# Patient Record
Sex: MALE | Race: WHITE | NOT HISPANIC OR LATINO | Employment: OTHER | URBAN - METROPOLITAN AREA
[De-identification: names, ages, dates, MRNs, and addresses within clinical notes are randomized per-mention and may not be internally consistent; named-entity substitution may affect disease eponyms.]

---

## 2017-04-21 ENCOUNTER — HOSPITAL ENCOUNTER (OUTPATIENT)
Dept: ONCOLOGY | Facility: CLINIC | Age: 77
Discharge: HOME | End: 2017-04-21
Attending: INTERNAL MEDICINE | Admitting: INTERNAL MEDICINE

## 2017-04-21 LAB
ALBUMIN SERPL-MCNC: 3.8 G/DL (ref 3.5–4.8)
ALBUMIN/GLOB SERPL: 1.5 {RATIO} (ref 1–1.7)
ALP SERPL-CCNC: 52 IU/L (ref 32–91)
ALT SERPL-CCNC: 17 IU/L (ref 17–63)
ANION GAP SERPL CALC-SCNC: 11.3 MMOL/L (ref 10–20)
AST SERPL-CCNC: 20 IU/L (ref 15–41)
BILIRUB SERPL-MCNC: 1.1 MG/DL (ref 0.3–1.2)
BUN SERPL-MCNC: 11 MG/DL (ref 8–20)
BUN/CREAT SERPL: 11 (ref 6.2–20.3)
CALCIUM SERPL-MCNC: 8.9 MG/DL (ref 8.9–10.3)
CHLORIDE SERPL-SCNC: 107 MMOL/L (ref 101–111)
CONV CO2: 28 MMOL/L (ref 22–32)
CONV TOTAL PROTEIN: 6.4 G/DL (ref 6.1–7.9)
CREAT UR-MCNC: 1 MG/DL (ref 0.7–1.2)
GLOBULIN UR ELPH-MCNC: 2.6 G/DL (ref 2.5–3.8)
GLUCOSE SERPL-MCNC: 112 MG/DL (ref 65–99)
POTASSIUM SERPL-SCNC: 4.3 MMOL/L (ref 3.6–5.1)
SODIUM SERPL-SCNC: 142 MMOL/L (ref 136–144)

## 2017-04-27 ENCOUNTER — HOSPITAL ENCOUNTER (OUTPATIENT)
Dept: ONCOLOGY | Facility: CLINIC | Age: 77
Discharge: HOME | End: 2017-04-27
Attending: INTERNAL MEDICINE | Admitting: INTERNAL MEDICINE

## 2017-04-27 LAB — GLUCOSE BLD-MCNC: 100 MG/DL (ref 70–105)

## 2017-05-22 ENCOUNTER — HOSPITAL ENCOUNTER (OUTPATIENT)
Dept: ONCOLOGY | Facility: CLINIC | Age: 77
Discharge: HOME | End: 2017-05-22
Attending: INTERNAL MEDICINE | Admitting: INTERNAL MEDICINE

## 2017-05-22 LAB — FOLATE SERPL-MCNC: 14.5 NG/ML (ref 5.9–24.8)

## 2017-05-24 LAB
ANA SER QL IA: NORMAL

## 2017-06-02 ENCOUNTER — HOSPITAL ENCOUNTER (OUTPATIENT)
Dept: ONCOLOGY | Facility: CLINIC | Age: 77
Discharge: HOME | End: 2017-06-02
Attending: INTERNAL MEDICINE | Admitting: INTERNAL MEDICINE

## 2017-06-26 ENCOUNTER — HOSPITAL ENCOUNTER (OUTPATIENT)
Dept: ONCOLOGY | Facility: CLINIC | Age: 77
Discharge: HOME | End: 2017-06-26
Attending: INTERNAL MEDICINE | Admitting: INTERNAL MEDICINE

## 2017-06-26 LAB
ALBUMIN SERPL-MCNC: 3.9 G/DL (ref 3.5–4.8)
ALBUMIN/GLOB SERPL: 1.6 {RATIO} (ref 1–1.7)
ALP SERPL-CCNC: 58 IU/L (ref 32–91)
ALT SERPL-CCNC: 19 IU/L (ref 17–63)
ANION GAP SERPL CALC-SCNC: 13.3 MMOL/L (ref 10–20)
AST SERPL-CCNC: 20 IU/L (ref 15–41)
BILIRUB SERPL-MCNC: 0.8 MG/DL (ref 0.3–1.2)
BUN SERPL-MCNC: 14 MG/DL (ref 8–20)
BUN/CREAT SERPL: 12.7 (ref 6.2–20.3)
CALCIUM SERPL-MCNC: 9.3 MG/DL (ref 8.9–10.3)
CHLORIDE SERPL-SCNC: 105 MMOL/L (ref 101–111)
CONV CO2: 28 MMOL/L (ref 22–32)
CONV TOTAL PROTEIN: 6.3 G/DL (ref 6.1–7.9)
CREAT UR-MCNC: 1.1 MG/DL (ref 0.7–1.2)
GLOBULIN UR ELPH-MCNC: 2.4 G/DL (ref 2.5–3.8)
GLUCOSE SERPL-MCNC: 117 MG/DL (ref 65–99)
POTASSIUM SERPL-SCNC: 4.3 MMOL/L (ref 3.6–5.1)
SODIUM SERPL-SCNC: 142 MMOL/L (ref 136–144)

## 2017-09-28 ENCOUNTER — CLINICAL SUPPORT (OUTPATIENT)
Dept: ONCOLOGY | Facility: HOSPITAL | Age: 77
End: 2017-09-28

## 2017-09-28 ENCOUNTER — HOSPITAL ENCOUNTER (OUTPATIENT)
Dept: ONCOLOGY | Facility: CLINIC | Age: 77
Setting detail: INFUSION SERIES
Discharge: HOME | End: 2017-09-28
Attending: NURSE PRACTITIONER | Admitting: NURSE PRACTITIONER

## 2017-09-28 ENCOUNTER — HOSPITAL ENCOUNTER (OUTPATIENT)
Dept: ONCOLOGY | Facility: HOSPITAL | Age: 77
Discharge: HOME | End: 2017-09-28
Attending: NURSE PRACTITIONER | Admitting: NURSE PRACTITIONER

## 2017-09-28 NOTE — PROGRESS NOTES
PATIENTS ONCOLOGY RECORD LOCATED IN [facility]      Subjective     Name:  REEMA LANG     Date:  2017  Address:   [address] [address]  Home: [phone]  :  1940 AGE:  77 y.o.        RECORDS OBTAINED:  Patients Oncology Record is located in [facility]

## 2017-12-18 ENCOUNTER — HOSPITAL ENCOUNTER (OUTPATIENT)
Dept: ONCOLOGY | Facility: HOSPITAL | Age: 77
Discharge: HOME | End: 2017-12-18
Attending: INTERNAL MEDICINE | Admitting: INTERNAL MEDICINE

## 2017-12-18 LAB — CREAT BLDA-MCNC: 1 MG/DL (ref 0.6–1.3)

## 2017-12-28 ENCOUNTER — CLINICAL SUPPORT (OUTPATIENT)
Dept: ONCOLOGY | Facility: HOSPITAL | Age: 77
End: 2017-12-28

## 2017-12-28 ENCOUNTER — HOSPITAL ENCOUNTER (OUTPATIENT)
Dept: ONCOLOGY | Facility: CLINIC | Age: 77
Setting detail: INFUSION SERIES
Discharge: HOME | End: 2017-12-28
Attending: INTERNAL MEDICINE | Admitting: INTERNAL MEDICINE

## 2017-12-28 ENCOUNTER — HOSPITAL ENCOUNTER (OUTPATIENT)
Dept: ONCOLOGY | Facility: HOSPITAL | Age: 77
Discharge: HOME | End: 2017-12-28
Attending: INTERNAL MEDICINE | Admitting: INTERNAL MEDICINE

## 2017-12-28 NOTE — PROGRESS NOTES
PATIENTS ONCOLOGY RECORD LOCATED IN [facility]      Subjective     Name:  REEMA LANG     Date:  2017  [address]   [facility] [place] [address]  Home: [phone]  :  1940 AGE:  77 y.o.        RECORDS OBTAINED:  Patients Oncology Record is located in [facility]

## 2018-06-21 ENCOUNTER — HOSPITAL ENCOUNTER (OUTPATIENT)
Dept: ONCOLOGY | Facility: HOSPITAL | Age: 78
Discharge: HOME | End: 2018-06-21
Attending: INTERNAL MEDICINE | Admitting: INTERNAL MEDICINE

## 2018-06-21 LAB — CREAT BLDA-MCNC: <0.2 MG/DL (ref 0.6–1.3)

## 2018-06-28 ENCOUNTER — HOSPITAL ENCOUNTER (OUTPATIENT)
Dept: ONCOLOGY | Facility: CLINIC | Age: 78
Setting detail: INFUSION SERIES
Discharge: HOME | End: 2018-06-28
Attending: INTERNAL MEDICINE | Admitting: INTERNAL MEDICINE

## 2018-06-28 ENCOUNTER — HOSPITAL ENCOUNTER (OUTPATIENT)
Dept: ONCOLOGY | Facility: HOSPITAL | Age: 78
Discharge: HOME | End: 2018-06-28
Attending: INTERNAL MEDICINE | Admitting: INTERNAL MEDICINE

## 2018-06-28 ENCOUNTER — CLINICAL SUPPORT (OUTPATIENT)
Dept: ONCOLOGY | Facility: HOSPITAL | Age: 78
End: 2018-06-28

## 2018-06-28 LAB
ALBUMIN SERPL-MCNC: 3.9 G/DL (ref 3.5–4.8)
ALBUMIN/GLOB SERPL: 1.4 {RATIO} (ref 1–1.7)
ALP SERPL-CCNC: 57 IU/L (ref 32–91)
ALT SERPL-CCNC: 18 IU/L (ref 17–63)
ANION GAP SERPL CALC-SCNC: 9 MMOL/L (ref 10–20)
AST SERPL-CCNC: 20 IU/L (ref 15–41)
BILIRUB SERPL-MCNC: 0.9 MG/DL (ref 0.3–1.2)
BUN SERPL-MCNC: 11 MG/DL (ref 8–20)
BUN/CREAT SERPL: 12.2 (ref 6.2–20.3)
CALCIUM SERPL-MCNC: 9 MG/DL (ref 8.9–10.3)
CHLORIDE SERPL-SCNC: 106 MMOL/L (ref 101–111)
CONV CO2: 27 MMOL/L (ref 22–32)
CONV TOTAL PROTEIN: 6.6 G/DL (ref 6.1–7.9)
CREAT UR-MCNC: 0.9 MG/DL (ref 0.7–1.2)
ERYTHROCYTE [SEDIMENTATION RATE] IN BLOOD BY WESTERGREN METHOD: 9 MM/HR (ref 0–20)
GLOBULIN UR ELPH-MCNC: 2.7 G/DL (ref 2.5–3.8)
GLUCOSE SERPL-MCNC: 123 MG/DL (ref 65–99)
LDH SERPL-CCNC: 148 IU/L (ref 98–192)
POTASSIUM SERPL-SCNC: 4 MMOL/L (ref 3.6–5.1)
SODIUM SERPL-SCNC: 138 MMOL/L (ref 136–144)

## 2018-06-28 NOTE — PROGRESS NOTES
PATIENTS ONCOLOGY RECORD LOCATED IN [facility]      Subjective     Name:  REEMA LANG     Date:  2018  [address] [place] [address]  Home: [phone]  :  1940 AGE:  78 y.o.        RECORDS OBTAINED:  Patients Oncology Record is located in [facility]

## 2018-07-18 ENCOUNTER — HOSPITAL ENCOUNTER (OUTPATIENT)
Dept: OTHER | Facility: HOSPITAL | Age: 78
Setting detail: SPECIMEN
Discharge: HOME | End: 2018-07-18
Attending: INTERNAL MEDICINE | Admitting: INTERNAL MEDICINE

## 2018-07-18 ENCOUNTER — OFFICE (AMBULATORY)
Dept: URBAN - METROPOLITAN AREA PATHOLOGY 4 | Facility: PATHOLOGY | Age: 78
End: 2018-07-18

## 2018-07-18 ENCOUNTER — ON CAMPUS - OUTPATIENT (AMBULATORY)
Dept: URBAN - METROPOLITAN AREA HOSPITAL 2 | Facility: HOSPITAL | Age: 78
End: 2018-07-18

## 2018-07-18 VITALS
DIASTOLIC BLOOD PRESSURE: 83 MMHG | HEART RATE: 64 BPM | SYSTOLIC BLOOD PRESSURE: 136 MMHG | HEART RATE: 59 BPM | DIASTOLIC BLOOD PRESSURE: 77 MMHG | RESPIRATION RATE: 16 BRPM | HEART RATE: 83 BPM | DIASTOLIC BLOOD PRESSURE: 57 MMHG | SYSTOLIC BLOOD PRESSURE: 119 MMHG | RESPIRATION RATE: 14 BRPM | OXYGEN SATURATION: 97 % | DIASTOLIC BLOOD PRESSURE: 70 MMHG | WEIGHT: 183 LBS | SYSTOLIC BLOOD PRESSURE: 112 MMHG | OXYGEN SATURATION: 96 % | SYSTOLIC BLOOD PRESSURE: 109 MMHG | HEART RATE: 75 BPM | HEART RATE: 66 BPM | DIASTOLIC BLOOD PRESSURE: 73 MMHG | SYSTOLIC BLOOD PRESSURE: 106 MMHG | DIASTOLIC BLOOD PRESSURE: 68 MMHG | HEART RATE: 77 BPM | DIASTOLIC BLOOD PRESSURE: 72 MMHG | OXYGEN SATURATION: 95 % | HEART RATE: 73 BPM | DIASTOLIC BLOOD PRESSURE: 66 MMHG | RESPIRATION RATE: 17 BRPM | SYSTOLIC BLOOD PRESSURE: 148 MMHG | OXYGEN SATURATION: 100 % | SYSTOLIC BLOOD PRESSURE: 101 MMHG | HEART RATE: 56 BPM | SYSTOLIC BLOOD PRESSURE: 125 MMHG | SYSTOLIC BLOOD PRESSURE: 115 MMHG | HEART RATE: 76 BPM | DIASTOLIC BLOOD PRESSURE: 75 MMHG | TEMPERATURE: 97.5 F | SYSTOLIC BLOOD PRESSURE: 107 MMHG | SYSTOLIC BLOOD PRESSURE: 105 MMHG | HEIGHT: 72 IN | DIASTOLIC BLOOD PRESSURE: 63 MMHG | OXYGEN SATURATION: 98 % | RESPIRATION RATE: 18 BRPM | SYSTOLIC BLOOD PRESSURE: 146 MMHG

## 2018-07-18 DIAGNOSIS — D12.3 BENIGN NEOPLASM OF TRANSVERSE COLON: ICD-10-CM

## 2018-07-18 DIAGNOSIS — K63.5 POLYP OF COLON: ICD-10-CM

## 2018-07-18 DIAGNOSIS — R10.13 EPIGASTRIC PAIN: ICD-10-CM

## 2018-07-18 DIAGNOSIS — K22.2 ESOPHAGEAL OBSTRUCTION: ICD-10-CM

## 2018-07-18 DIAGNOSIS — Z86.010 PERSONAL HISTORY OF COLONIC POLYPS: ICD-10-CM

## 2018-07-18 DIAGNOSIS — D12.2 BENIGN NEOPLASM OF ASCENDING COLON: ICD-10-CM

## 2018-07-18 DIAGNOSIS — K29.50 UNSPECIFIED CHRONIC GASTRITIS WITHOUT BLEEDING: ICD-10-CM

## 2018-07-18 DIAGNOSIS — R13.10 DYSPHAGIA, UNSPECIFIED: ICD-10-CM

## 2018-07-18 PROBLEM — K29.70 GASTRITIS, UNSPECIFIED, WITHOUT BLEEDING: Status: ACTIVE | Noted: 2018-07-18

## 2018-07-18 PROBLEM — D12.5 BENIGN NEOPLASM OF SIGMOID COLON: Status: ACTIVE | Noted: 2018-07-18

## 2018-07-18 LAB
GI HISTOLOGY: A. SELECT: (no result)
GI HISTOLOGY: B. UNSPECIFIED: (no result)
GI HISTOLOGY: C. UNSPECIFIED: (no result)
GI HISTOLOGY: D. UNSPECIFIED: (no result)
GI HISTOLOGY: PDF REPORT: (no result)

## 2018-07-18 PROCEDURE — 88305 TISSUE EXAM BY PATHOLOGIST: CPT | Mod: 26 | Performed by: INTERNAL MEDICINE

## 2018-07-18 PROCEDURE — 45385 COLONOSCOPY W/LESION REMOVAL: CPT | Mod: PT | Performed by: INTERNAL MEDICINE

## 2018-07-18 PROCEDURE — 45380 COLONOSCOPY AND BIOPSY: CPT | Mod: 59,PT | Performed by: INTERNAL MEDICINE

## 2018-07-18 PROCEDURE — 43239 EGD BIOPSY SINGLE/MULTIPLE: CPT | Mod: 59 | Performed by: INTERNAL MEDICINE

## 2018-07-18 PROCEDURE — 43450 DILATE ESOPHAGUS 1/MULT PASS: CPT | Performed by: INTERNAL MEDICINE

## 2018-07-18 PROCEDURE — 45380 COLONOSCOPY AND BIOPSY: CPT | Mod: PT,59 | Performed by: INTERNAL MEDICINE

## 2018-07-18 RX ADMIN — PROPOFOL: 10 INJECTION, EMULSION INTRAVENOUS at 14:51

## 2018-07-18 NOTE — SERVICEHPINOTES
ANNY BENITO  is a  78  male   who presents today for a  EGD-Colonoscopy   for   the indications listed below. The updated Patient Profile was reviewed prior to the procedure, in conjunction with the Physical Exam, including medical conditions, surgical procedures, medications, allergies, family history and social history. See Physical Exam time stamp below for date and time of HPI completion.Pre-operatively, I reviewed the indication(s) for the procedure, the risks of the procedure [including but not limited to: unexpected bleeding possibly requiring hospitalization and/or unplanned repeat procedures, perforation possibly requiring surgical treatment, missed lesions and complications of sedation/MAC (also explained by anesthesia staff)]. I have evaluated the patient for risks associated with the planned anesthesia and the procedure to be performed and find the patient an acceptable candidate for IV sedation.Multiple opportunities were provided for any questions or concerns, and all questions were answered satisfactorily before any anesthesia was administered. We will proceed with the planned procedure.BR

## 2018-10-25 ENCOUNTER — HOSPITAL ENCOUNTER (OUTPATIENT)
Dept: ONCOLOGY | Facility: HOSPITAL | Age: 78
Discharge: HOME | End: 2018-10-25
Attending: INTERNAL MEDICINE | Admitting: INTERNAL MEDICINE

## 2018-10-25 ENCOUNTER — CLINICAL SUPPORT (OUTPATIENT)
Dept: ONCOLOGY | Facility: HOSPITAL | Age: 78
End: 2018-10-25

## 2018-10-25 ENCOUNTER — HOSPITAL ENCOUNTER (OUTPATIENT)
Dept: ONCOLOGY | Facility: CLINIC | Age: 78
Setting detail: INFUSION SERIES
Discharge: HOME | End: 2018-10-25
Attending: INTERNAL MEDICINE | Admitting: INTERNAL MEDICINE

## 2018-10-25 LAB — TSH SERPL-ACNC: 1.09 UIU/ML (ref 0.34–5.6)

## 2018-10-25 NOTE — PROGRESS NOTES
PATIENTS ONCOLOGY RECORD LOCATED IN [facility]      Subjective     Name:  REEMA LANG     Date:  10/25/2018  Address:   [address] [place] [address]  Home: [phone]  :  1940 AGE:  78 y.o.        RECORDS OBTAINED:  Patients Oncology Record is located in [facility]

## 2018-12-14 ENCOUNTER — HOSPITAL ENCOUNTER (OUTPATIENT)
Dept: PREADMISSION TESTING | Facility: HOSPITAL | Age: 78
Discharge: HOME | End: 2018-12-14
Attending: ORTHOPAEDIC SURGERY | Admitting: ORTHOPAEDIC SURGERY

## 2018-12-14 LAB
ANION GAP SERPL CALC-SCNC: 12.9 MMOL/L (ref 10–20)
BASOPHILS # BLD AUTO: 0 10*3/UL (ref 0–0.2)
BASOPHILS NFR BLD AUTO: 1 % (ref 0–2)
BUN SERPL-MCNC: 14 MG/DL (ref 8–20)
BUN/CREAT SERPL: 14 (ref 6.2–20.3)
CALCIUM SERPL-MCNC: 8.8 MG/DL (ref 8.9–10.3)
CHLORIDE SERPL-SCNC: 104 MMOL/L (ref 101–111)
CONV CO2: 28 MMOL/L (ref 22–32)
CREAT UR-MCNC: 1 MG/DL (ref 0.7–1.2)
DIFFERENTIAL METHOD BLD: (no result)
EOSINOPHIL # BLD AUTO: 0.1 10*3/UL (ref 0–0.3)
EOSINOPHIL # BLD AUTO: 1 % (ref 0–3)
ERYTHROCYTE [DISTWIDTH] IN BLOOD BY AUTOMATED COUNT: 13.4 % (ref 11.5–14.5)
GLUCOSE SERPL-MCNC: 112 MG/DL (ref 65–99)
HCT VFR BLD AUTO: 43.5 % (ref 40–54)
HGB BLD-MCNC: 14.9 G/DL (ref 14–18)
LYMPHOCYTES # BLD AUTO: 1.2 10*3/UL (ref 0.8–4.8)
LYMPHOCYTES NFR BLD AUTO: 22 % (ref 18–42)
MCH RBC QN AUTO: 30.2 PG (ref 26–32)
MCHC RBC AUTO-ENTMCNC: 34.4 G/DL (ref 32–36)
MCV RBC AUTO: 87.9 FL (ref 80–94)
MONOCYTES # BLD AUTO: 0.5 10*3/UL (ref 0.1–1.3)
MONOCYTES NFR BLD AUTO: 9 % (ref 2–11)
NEUTROPHILS # BLD AUTO: 3.7 10*3/UL (ref 2.3–8.6)
NEUTROPHILS NFR BLD AUTO: 67 % (ref 50–75)
NRBC BLD AUTO-RTO: 0 /100{WBCS}
NRBC/RBC NFR BLD MANUAL: 0 10*3/UL
PLATELET # BLD AUTO: 130 10*3/UL (ref 150–450)
PMV BLD AUTO: 8.7 FL (ref 7.4–10.4)
POTASSIUM SERPL-SCNC: 3.9 MMOL/L (ref 3.6–5.1)
RBC # BLD AUTO: 4.94 10*6/UL (ref 4.6–6)
SODIUM SERPL-SCNC: 141 MMOL/L (ref 136–144)
WBC # BLD AUTO: 5.6 10*3/UL (ref 4.5–11.5)

## 2019-02-28 ENCOUNTER — HOSPITAL ENCOUNTER (OUTPATIENT)
Dept: ONCOLOGY | Facility: CLINIC | Age: 79
Setting detail: INFUSION SERIES
Discharge: HOME | End: 2019-02-28
Attending: NURSE PRACTITIONER | Admitting: NURSE PRACTITIONER

## 2019-02-28 ENCOUNTER — HOSPITAL ENCOUNTER (OUTPATIENT)
Dept: ONCOLOGY | Facility: HOSPITAL | Age: 79
Discharge: HOME | End: 2019-02-28

## 2019-03-18 ENCOUNTER — HOSPITAL ENCOUNTER (OUTPATIENT)
Dept: OTHER | Facility: HOSPITAL | Age: 79
Setting detail: SPECIMEN
Discharge: HOME | End: 2019-03-18
Attending: UROLOGY | Admitting: UROLOGY

## 2019-03-19 LAB — SPECIMEN SOURCE: NORMAL

## 2019-06-25 ENCOUNTER — TELEPHONE (OUTPATIENT)
Dept: ONCOLOGY | Facility: CLINIC | Age: 79
End: 2019-06-25

## 2019-06-26 DIAGNOSIS — C82.90 FOLLICULAR LYMPHOMA, UNSPECIFIED FOLLICULAR LYMPHOMA TYPE, UNSPECIFIED BODY REGION (HCC): Primary | ICD-10-CM

## 2019-06-26 PROBLEM — C85.10 B-CELL LYMPHOMA (HCC): Status: ACTIVE | Noted: 2019-06-26

## 2019-06-26 PROBLEM — K29.70 GASTRITIS: Status: ACTIVE | Noted: 2019-06-26

## 2019-06-26 PROBLEM — D69.3 CHRONIC ITP (IDIOPATHIC THROMBOCYTOPENIA): Status: ACTIVE | Noted: 2019-06-26

## 2019-06-26 PROBLEM — N28.1 RENAL CYST: Status: ACTIVE | Noted: 2019-06-26

## 2019-06-26 PROBLEM — R31.9 HEMATURIA: Status: ACTIVE | Noted: 2019-06-26

## 2019-06-26 NOTE — PROGRESS NOTES
Hematology/Oncology Outpatient Follow Up    Curly Razo  1940    Primary Care Physician: Jose Mcgowan MD  Referring Physician: Jose Mcgowan MD    Chief Complaint   Patient presents with   • B-cell lymphoma follow up        History of Present Illness:   1. 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   • This patient is a 77-year-old male who has been under the care of Jaime Reeves M.D. for kidney stones since the early 2000’s requiring several extractions and lithotripsies. He gets yearly CT scans. CT of the abdomen has been revealing minor retroperitoneal lymphadenopathy and in 2016 patient was noted to have some scarring in the lungs. He underwent a PET scan on 3/12/16 revealing no nodule in the lung but scarring in the medial right thoracic apex without increased activity. Morphologically his linear scarring was stable since January 2012 and there was no evidence of metastatic disease elsewhere in the body. Large left renal cyst was present. He had a CT scan of the chest repeated on 9/13/16 again noting no discreet measurable lung nodule with stable scarring in the lung apices, right greater than left. There was stable small hiatal hernia and simple-appearing though partially imaged left-sided kidney cyst. CT scan of the abdomen and pelvis was performed at [facility] on 3/31/17 revealing multiple enlarged retroperitoneal lymph nodes, largest a left periaortic node measuring 15 mm short axis, not clearly changed from prior CT’s as well as bilateral intracaliceal calculi with much more calculus burden on the left than the right. There was mild right hydronephrosis, left renal cyst also present. The lymphadenopathy was felt to be stable since 2016 but increased over a four year period and hence Oncology consultation was obtained. The patient himself is denying fevers, night sweats or weight loss. He has not felt any lymph nodes anywhere else in the  body.   • 7/18/13 - EGD and colonoscopy by Nelson Monte M.D. revealed strictures in the gastroesophageal junction that were dilated. Erythema in the antrum compatible with gastritis was biopsied. There was fluid in the stomach body. 4-5 mm polyps in the transverse colon, 3-5 mm polyps in the ascending colon, 3 mm polyp in the sigmoid colon were seen. Pathology on stomach biopsy revealed antral and body/fundic type mucosa with mild chronic gastritis. Transverse colon polyp was a tubular adenoma. Ascending colon polyp was a tubular adenoma and sigmoid colon polyp was hyperplastic. Followup colonoscopy was recommended in three years.   • 4/21/17 - [facility] by Jaime Reeves M.D. for retroperitoneal lymphadenopathy. WBC 5.9, hemoglobin 14.6, platelet count 126,000. Comprehensive metabolic panel with no significant abnormality. ESR 3 (<21).   • 4/27/17 - PET scan with pathologic periaortic lymphadenopathy with most notable abnormal lymph node seen below the level of the left adrenal vein measuring 1.9 x 1.5 cm associated with FDG elevation at uptake maximum of 4.2.    • 5/19/17 - Patient underwent CT-guided biopsy of a retroperitoneal lymph node by Lenny Gonzalez M.D.  Pathology on lymph node biopsy revealed B-cell lymphoma of germinal center origin. A low-grade follicular lymphoma was favored; however, given the scant nature of the specimen a more definite diagnosis could not be rendered.   • 5/22/17 - WBC 5.0, hemoglobin 14.9, platelet count 117,000. Lymph node pathology results not available on today’s visit. Thrombocytopenia workup initiated.   • 6/26/17 - Discussed results of the workup given the history of slow growth of lymph nodes on serial CAT scans. It likely is of low grade. CMP without clinical significance. ESR 6 (<21).   • 10/3/17 - CT abdomen and pelvis at [facility] with bilateral renal calculi with greater stone burden on the left.  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   • 12/18/17 - CT chest with stable irregular density and linear abnormality in the right pulmonary apex, possibly scar. Cardiomegaly, small hiatal hernia and probable cyst in the left kidney.   • 6/21/18 - CT chest with no acute intrathoracic process. CT abdomen and pelvis with slight interval increase in size of upper abdominal retroperitoneal adenopathy when compared to April 2017. Bilateral non-obstructing nephrolithiasis with large non-obstructing left lower pole renal calculi, largest measuring 14 mm.   • 6/28/18 - Patient complains of abdominal pain post eating. EGD recommended.  (). ESR 9 (0-20).     • 10/25/18 - Patient complains of still some mild pain in the epigastric area. Omeprazole 40 mg p.o. daily prescribed. Complains of fatigue for which TSH checked: 1.09.   • 12/14/18 - Chest x-ray with no acute cardiopulmonary process.   • 2/19/19 - Patient seen at [facility] for abdominal pain, nausea, vomiting, diarrhea and dehydration. CT abdomen and pelvis showed multiple bilateral non-obstructing renal stones, no evidence of ureteral stone or hydronephrosis but there was possible bilateral UPJ stenosis. Stable appearance of multiple low-density renal masses, likely cysts. Mild retroperitoneal adenopathy with the largest node measuring 1.9 cm in size, slightly larger from prior study where it measured 1.6 cm. This CT was compared to CT in September 2014.   4/9/19 CT abdomen and pelvis performed at [facility] with mild lower pole  caliectasis on the left.  Possible small focus of infection or infarction in the lower  pole of the left kidney.  Bilateral renal cysts.  Prior left inguinal hernia repair with no  recurrence of hernia.  2.   Thrombocytopenia diagnosed April 2017.   • 4/21/17 - WBC 5.9, hemoglobin 14.6, platelet count  126,000.   • 5/23/17 - ANTHONY screen negative. Folate 14.5 (5.9-24.8), Vitamin B12 of 435 (211-911). EBV IgM <0.2 (<0.9), EBV IgG >8 (<0.9). CMV IgM 0.09 (<0.91), CMV IgG 0.2 (<0.9). PT PTT 12.7 and 27.2 seconds. Platelet antibodies not detected.         • 6/2/17 - Bone marrow aspiration and biopsy with normocellular bone marrow (30%) with increased iron stores. Flow cytometry with no significant abnormality. Cytogenetics with normal male karyotype.   • 9/28/17 - WBC 6.0, hemoglobin 14.8, platelet count 125,000. Platelet count is stable. Patient denies any increased bruising or unusual bleeding.     No past medical history on file.    Past Surgical History:   Procedure Laterality Date   • APPENDECTOMY      age 30   • CARDIAC CATHETERIZATION      1993, 2001, and 2015.    • CATARACT EXTRACTION, BILATERAL  07/2015   • COLONOSCOPY      normal -- 2009 and 2014   • ENDOSCOPY      Normal EGD in September 2005, and July 2010.    • HERNIA REPAIR  07/2004   • KNEE ARTHROSCOPY Left 12/2018    uncomplicated, performed by Dr. Thomas   • LAPAROSCOPIC CHOLECYSTECTOMY  07/2004   • OTHER SURGICAL HISTORY      He underwent lithotripsy for chronic kidney stones by Dr. Reeves in 2015, 2014,  2012, 2011, 2009, 2006, 2004, and 2001.    • PROSTATE BIOPSY      benign biopsy in 2006, and 2011.    • RETINAL DETACHMENT SURGERY  05/2015       No current outpatient medications on file.    Allergies   Allergen Reactions   • Penicillins Rash   • Soma [Carisoprodol] Rash   • Sulfa Antibiotics Rash       Family History   Family history unknown: Yes       Family history is unknown by patient.    Social History     Tobacco Use   • Smoking status: Never Smoker   Substance Use Topics   • Alcohol use: No     Frequency: Never   • Drug use: No       I have reviewed the history of present illness, past medical history, family history, social history, lab results, all notes and other records since the patient was last seen on 6/25/2019    SUBJECTIVE: Patient is  "here to follow up on B-cell non hodgkin's lymphoma and ITP. ANTONIO Calvert present during office visit. Reports to having kidney stone surgery and a CT scan about 3 months ago at [facility].         ROS:  Review of Systems   Constitutional: Negative for chills and fever.   HENT: Negative for ear pain, mouth sores, nosebleeds and sore throat.    Eyes: Negative for photophobia and visual disturbance.   Respiratory: Negative for wheezing and stridor.    Cardiovascular: Negative for chest pain and palpitations.   Gastrointestinal: Negative for abdominal pain, diarrhea, nausea and vomiting.   Endocrine: Negative for cold intolerance and heat intolerance.   Genitourinary: Negative for dysuria and hematuria.   Musculoskeletal: Negative for joint swelling and neck stiffness.   Skin: Negative for color change and rash.   Neurological: Negative for seizures and syncope.   Hematological: Negative for adenopathy.        No obvious bleeding   Psychiatric/Behavioral: Negative for agitation, confusion and hallucinations.       Objective:    Vitals:    06/27/19 0826   BP: 145/80   Pulse: 56   Resp: 18   Temp: 97.9 °F (36.6 °C)   Weight: 83.7 kg (184 lb 9.6 oz)   Height: 182.9 cm (72\")   PainSc:   6   PainLoc: Elbow  Comment: left       ECOG  (1) Restricted in physically strenuous activity, ambulatory and able to do work of light nature    Physical Exam   Constitutional: He is oriented to person, place, and time. No distress.   HENT:   Head: Normocephalic and atraumatic.   Dental fillings. Small angioma right lower lip.    Eyes: Conjunctivae and EOM are normal. Right eye exhibits no discharge. Left eye exhibits no discharge. No scleral icterus.   Neck: Normal range of motion. Neck supple. No thyromegaly present.   Cardiovascular: Normal rate, regular rhythm and normal heart sounds. Exam reveals no gallop and no friction rub.   Pulmonary/Chest: Effort normal. No stridor. No respiratory distress. He has no wheezes. "   Abdominal: Soft. Bowel sounds are normal. He exhibits no mass. There is no tenderness. There is no rebound and no guarding.   Musculoskeletal: Normal range of motion. He exhibits no tenderness.   Degenerative changes to joints   Lymphadenopathy:     He has no cervical adenopathy.   Neurological: He is alert and oriented to person, place, and time. He exhibits normal muscle tone.   Skin: Skin is warm. No rash noted. He is not diaphoretic. No erythema.   Psychiatric: He has a normal mood and affect. His behavior is normal.   Nursing note and vitals reviewed.      RECENT LABS  WBC   Date Value Ref Range Status   06/27/2019 4.69 3.40 - 10.80 10*3/mm3 Final     RBC   Date Value Ref Range Status   06/27/2019 5.02 4.14 - 5.80 10*6/mm3 Final     Hemoglobin   Date Value Ref Range Status   06/27/2019 15.1 13.0 - 17.7 g/dL Final     Hematocrit   Date Value Ref Range Status   06/27/2019 44.1 37.5 - 51.0 % Final     MCV   Date Value Ref Range Status   06/27/2019 87.8 79.0 - 97.0 fL Final     MCH   Date Value Ref Range Status   06/27/2019 30.1 26.6 - 33.0 pg Final     MCHC   Date Value Ref Range Status   06/27/2019 34.2 31.5 - 35.7 g/dL Final     RDW   Date Value Ref Range Status   06/27/2019 13.0 12.3 - 15.4 % Final     RDW-SD   Date Value Ref Range Status   06/27/2019 41.1 37.0 - 54.0 fl Final     MPV   Date Value Ref Range Status   06/27/2019 10.3 6.0 - 12.0 fL Final     Platelets   Date Value Ref Range Status   06/27/2019 120 (L) 140 - 450 10*3/mm3 Final     Neutrophil %   Date Value Ref Range Status   06/27/2019 71.4 42.7 - 76.0 % Final     Lymphocyte %   Date Value Ref Range Status   06/27/2019 19.6 19.6 - 45.3 % Final     Monocyte %   Date Value Ref Range Status   06/27/2019 7.9 5.0 - 12.0 % Final     Eosinophil %   Date Value Ref Range Status   06/27/2019 0.9 0.3 - 6.2 % Final     Basophil %   Date Value Ref Range Status   06/27/2019 0.2 0.0 - 1.5 % Final     Neutrophils, Absolute   Date Value Ref Range Status    06/27/2019 3.35 1.70 - 7.00 10*3/mm3 Final     Lymphocytes, Absolute   Date Value Ref Range Status   06/27/2019 0.92 0.70 - 3.10 10*3/mm3 Final     Monocytes, Absolute   Date Value Ref Range Status   06/27/2019 0.37 0.10 - 0.90 10*3/mm3 Final     Eosinophils, Absolute   Date Value Ref Range Status   06/27/2019 0.04 0.00 - 0.40 10*3/mm3 Final     Basophils, Absolute   Date Value Ref Range Status   06/27/2019 0.01 0.00 - 0.20 10*3/mm3 Final     nRBC   Date Value Ref Range Status   03/15/2019 0 0 /100[WBCs] Final       Lab Results   Component Value Date    GLUCOSE 94 03/15/2019    BUN 11 03/15/2019    CREATININE 0.9 03/15/2019    EGFRIFAFRI NOT CALCULATED 06/21/2018    BCR 12.2 03/15/2019    K 3.9 03/15/2019    CO2 24 03/15/2019    CALCIUM 8.6 (L) 03/15/2019    ALBUMIN 3.9 06/28/2018    LABIL2 1.4 06/28/2018    AST 20 06/28/2018    ALT 18 06/28/2018         Assessment/Plan     Follicular lymphoma, unspecified follicular lymphoma type, unspecified body region (CMS/HCC)  - CBC & Differential  - CBC Auto Differential  - Sedimentation Rate  - Comprehensive Metabolic Panel    Chronic ITP (idiopathic thrombocytopenia) (CMS/[facility])      ASSESSMENT:  It has been 2 years since the diagnosis and disease does not seem to be progressing fast. Explained this is a slow growing lymphoma. This may never need treatment in his life time. If he does need treatment it is a treatable condition but it is not curable.  His platelet count is low but stable again requiring no specific treatment at present.  He is not taking omeprazole anymore as he does not have any gastritis symptoms.  Renal stones are being addressed by [facility].      PLAN:  I have reviewed labs results, imaging, vitals, and medications with the patient today. Will follow up in four months with the nurse practitioner x2 with CMP and ESR..We will plan on obtaining CT CAP in April 2020.       Patient verbalized understanding and is in agreement of the above plan.    I have  reviewed and validated the information above.   Red Ramsey M.D., [email or web address].

## 2019-06-27 ENCOUNTER — OFFICE VISIT (OUTPATIENT)
Dept: ONCOLOGY | Facility: CLINIC | Age: 79
End: 2019-06-27

## 2019-06-27 VITALS
DIASTOLIC BLOOD PRESSURE: 80 MMHG | RESPIRATION RATE: 18 BRPM | HEART RATE: 56 BPM | WEIGHT: 184.6 LBS | TEMPERATURE: 97.9 F | SYSTOLIC BLOOD PRESSURE: 145 MMHG | BODY MASS INDEX: 25 KG/M2 | HEIGHT: 72 IN

## 2019-06-27 DIAGNOSIS — D69.3 CHRONIC ITP (IDIOPATHIC THROMBOCYTOPENIA) (HCC): ICD-10-CM

## 2019-06-27 DIAGNOSIS — C82.90 FOLLICULAR LYMPHOMA, UNSPECIFIED FOLLICULAR LYMPHOMA TYPE, UNSPECIFIED BODY REGION (HCC): Primary | ICD-10-CM

## 2019-06-27 LAB
BASOPHILS # BLD AUTO: 0.01 10*3/MM3 (ref 0–0.2)
BASOPHILS NFR BLD AUTO: 0.2 % (ref 0–1.5)
DEPRECATED RDW RBC AUTO: 41.1 FL (ref 37–54)
EOSINOPHIL # BLD AUTO: 0.04 10*3/MM3 (ref 0–0.4)
EOSINOPHIL NFR BLD AUTO: 0.9 % (ref 0.3–6.2)
ERYTHROCYTE [DISTWIDTH] IN BLOOD BY AUTOMATED COUNT: 13 % (ref 12.3–15.4)
HCT VFR BLD AUTO: 44.1 % (ref 37.5–51)
HGB BLD-MCNC: 15.1 G/DL (ref 13–17.7)
LYMPHOCYTES # BLD AUTO: 0.92 10*3/MM3 (ref 0.7–3.1)
LYMPHOCYTES NFR BLD AUTO: 19.6 % (ref 19.6–45.3)
MCH RBC QN AUTO: 30.1 PG (ref 26.6–33)
MCHC RBC AUTO-ENTMCNC: 34.2 G/DL (ref 31.5–35.7)
MCV RBC AUTO: 87.8 FL (ref 79–97)
MONOCYTES # BLD AUTO: 0.37 10*3/MM3 (ref 0.1–0.9)
MONOCYTES NFR BLD AUTO: 7.9 % (ref 5–12)
NEUTROPHILS # BLD AUTO: 3.35 10*3/MM3 (ref 1.7–7)
NEUTROPHILS NFR BLD AUTO: 71.4 % (ref 42.7–76)
PLATELET # BLD AUTO: 120 10*3/MM3 (ref 140–450)
PMV BLD AUTO: 10.3 FL (ref 6–12)
RBC # BLD AUTO: 5.02 10*6/MM3 (ref 4.14–5.8)
WBC NRBC COR # BLD: 4.69 10*3/MM3 (ref 3.4–10.8)

## 2019-06-27 PROCEDURE — 36415 COLL VENOUS BLD VENIPUNCTURE: CPT | Performed by: INTERNAL MEDICINE

## 2019-06-27 PROCEDURE — 85025 COMPLETE CBC W/AUTO DIFF WBC: CPT | Performed by: INTERNAL MEDICINE

## 2019-10-21 NOTE — PROGRESS NOTES
Hematology/Oncology Outpatient Follow Up    Curly Razo  1940    Primary Care Physician: Jose Mcgowan MD  Referring Physician: Jose Mcgowan MD    Chief Complaint   Patient presents with   • Follow-up     Follicular lymphoma, Chronic ITP       History of Present Illness:   1. 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   • This patient is a 77-year-old male who has been under the care of Jaime Reeves M.D. for kidney stones since the early 2000’s requiring several extractions and lithotripsies. He gets yearly CT scans. CT of the abdomen has been revealing minor retroperitoneal lymphadenopathy and in 2016 patient was noted to have some scarring in the lungs. He underwent a PET scan on 3/12/16 revealing no nodule in the lung but scarring in the medial right thoracic apex without increased activity. Morphologically his linear scarring was stable since January 2012 and there was no evidence of metastatic disease elsewhere in the body. Large left renal cyst was present. He had a CT scan of the chest repeated on 9/13/16 again noting no discreet measurable lung nodule with stable scarring in the lung apices, right greater than left. There was stable small hiatal hernia and simple-appearing though partially imaged left-sided kidney cyst. CT scan of the abdomen and pelvis was performed at [facility] on 3/31/17 revealing multiple enlarged retroperitoneal lymph nodes, largest a left periaortic node measuring 15 mm short axis, not clearly changed from prior CT’s as well as bilateral intracaliceal calculi with much more calculus burden on the left than the right. There was mild right hydronephrosis, left renal cyst also present. The lymphadenopathy was felt to be stable since 2016 but increased over a four year period and hence Oncology consultation was obtained. The patient himself is denying fevers, night sweats or weight loss. He has not felt any lymph nodes  anywhere else in the body.   • 7/18/13 - EGD and colonoscopy by Nelson Monte M.D. revealed strictures in the gastroesophageal junction that were dilated. Erythema in the antrum compatible with gastritis was biopsied. There was fluid in the stomach body. 4-5 mm polyps in the transverse colon, 3-5 mm polyps in the ascending colon, 3 mm polyp in the sigmoid colon were seen. Pathology on stomach biopsy revealed antral and body/fundic type mucosa with mild chronic gastritis. Transverse colon polyp was a tubular adenoma. Ascending colon polyp was a tubular adenoma and sigmoid colon polyp was hyperplastic. Followup colonoscopy was recommended in three years.   • 4/21/17 - [facility] by Jaime Reeves M.D. for retroperitoneal lymphadenopathy. WBC 5.9, hemoglobin 14.6, platelet count 126,000. Comprehensive metabolic panel with no significant abnormality. ESR 3 (<21).   • 4/27/17 - PET scan with pathologic periaortic lymphadenopathy with most notable abnormal lymph node seen below the level of the left adrenal vein measuring 1.9 x 1.5 cm associated with FDG elevation at uptake maximum of 4.2.    • 5/19/17 - Patient underwent CT-guided biopsy of a retroperitoneal lymph node by Lenny Gonzalez M.D.  Pathology on lymph node biopsy revealed B-cell lymphoma of germinal center origin. A low-grade follicular lymphoma was favored; however, given the scant nature of the specimen a more definite diagnosis could not be rendered.   • 5/22/17 - WBC 5.0, hemoglobin 14.9, platelet count 117,000. Lymph node pathology results not available on today’s visit. Thrombocytopenia workup initiated.   • 6/26/17 - Discussed results of the workup given the history of slow growth of lymph nodes on serial CAT scans. It likely is of low grade. CMP without clinical significance. ESR 6 (<21).   • 10/3/17 - CT abdomen and pelvis at [facility] with bilateral renal calculi with greater  stone burden on the left. 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   • 12/18/17 - CT chest with stable irregular density and linear abnormality in the right pulmonary apex, possibly scar. Cardiomegaly, small hiatal hernia and probable cyst in the left kidney.   • 6/21/18 - CT chest with no acute intrathoracic process. CT abdomen and pelvis with slight interval increase in size of upper abdominal retroperitoneal adenopathy when compared to April 2017. Bilateral non-obstructing nephrolithiasis with large non-obstructing left lower pole renal calculi, largest measuring 14 mm.   • 6/28/18 - Patient complains of abdominal pain post eating. EGD recommended.  (). ESR 9 (0-20).     • 10/25/18 - Patient complains of still some mild pain in the epigastric area. Omeprazole 40 mg p.o. daily prescribed. Complains of fatigue for which TSH checked: 1.09.   • 12/14/18 - Chest x-ray with no acute cardiopulmonary process.   • 2/19/19 - Patient seen at [facility] for abdominal pain, nausea, vomiting, diarrhea and dehydration. CT abdomen and pelvis showed multiple bilateral non-obstructing renal stones, no evidence of ureteral stone or hydronephrosis but there was possible bilateral UPJ stenosis. Stable appearance of multiple low-density renal masses, likely cysts. Mild retroperitoneal adenopathy with the largest node measuring 1.9 cm in size, slightly larger from prior study where it measured 1.6 cm. This CT was compared to CT in September 2014.  • 4/9/19 CT abdomen and pelvis performed at [facility] with mild lower pole caliectasis on the left.  Possible small focus of infection or infarction in the lower pole of the left kidney.  Bilateral renal cysts.  Prior left inguinal hernia repair with no recurrence of hernia.    2.   Thrombocytopenia diagnosed April 2017.   • 4/21/17 - WBC 5.9, hemoglobin  14.6, platelet count 126,000.   • 5/23/17 - ANTHONY screen negative. Folate 14.5 (5.9-24.8), Vitamin B12 of 435 (211-911). EBV IgM <0.2 (<0.9), EBV IgG >8 (<0.9). CMV IgM 0.09 (<0.91), CMV IgG 0.2 (<0.9). PT PTT 12.7 and 27.2 seconds. Platelet antibodies not detected.         • 6/2/17 - Bone marrow aspiration and biopsy with normocellular bone marrow (30%) with increased iron stores. Flow cytometry with no significant abnormality. Cytogenetics with normal male karyotype.   • 9/28/17 - WBC 6.0, hemoglobin 14.8, platelet count 125,000. Platelet count is stable. Patient denies any increased bruising or unusual bleeding.     History reviewed. No pertinent past medical history.    Past Surgical History:   Procedure Laterality Date   • APPENDECTOMY      age 30   • CARDIAC CATHETERIZATION      1993, 2001, and 2015.    • CATARACT EXTRACTION, BILATERAL  07/2015   • COLONOSCOPY      normal -- 2009 and 2014   • ENDOSCOPY      Normal EGD in September 2005, and July 2010.    • HERNIA REPAIR  07/2004   • KNEE ARTHROSCOPY Left 12/2018    uncomplicated, performed by Dr. Thomas   • LAPAROSCOPIC CHOLECYSTECTOMY  07/2004   • OTHER SURGICAL HISTORY      He underwent lithotripsy for chronic kidney stones by Dr. Reeves in 2015, 2014,  2012, 2011, 2009, 2006, 2004, and 2001.    • PROSTATE BIOPSY      benign biopsy in 2006, and 2011.    • RETINAL DETACHMENT SURGERY  05/2015       No current outpatient medications on file.    Allergies   Allergen Reactions   • Penicillins Rash   • Soma [Carisoprodol] Rash   • Sulfa Antibiotics Rash       Family History   Family history unknown: Yes       Family history is unknown by patient.    Social History     Tobacco Use   • Smoking status: Never Smoker   • Smokeless tobacco: Never Used   Substance Use Topics   • Alcohol use: No     Frequency: Never   • Drug use: No       I have reviewed the history of present illness, past medical history, family history, social history, lab results, all notes and other  records since the patient was last seen on 6/27/2019    SUBJECTIVE:   Patient states that he is feeling more fatigued than normal and it is getting worse.  He does not eat very much and is sleeping more often. He has had his flu vaccine this year.  He has lower back pain and thinks it is due to a kidney stone.  He has had many kidney stones in the past and has had 5 surgeries to remove them.  He denies any problems urinating. He follows with a Urologist.   Sandra Ferreira CMA ([facility]) was present during office visit.           ROS:  Review of Systems   Constitutional: Positive for appetite change and fatigue. Negative for activity change, chills, diaphoresis, fever and unexpected weight change.   HENT: Negative for congestion, ear pain, mouth sores, nosebleeds, rhinorrhea and sore throat.    Eyes: Negative.  Negative for photophobia and visual disturbance.   Respiratory: Negative for cough, chest tightness, shortness of breath, wheezing and stridor.    Cardiovascular: Negative for chest pain, palpitations and leg swelling.   Gastrointestinal: Negative for abdominal pain, blood in stool, constipation, diarrhea, nausea and vomiting.   Endocrine: Negative for cold intolerance and heat intolerance.   Genitourinary: Negative for difficulty urinating, dysuria and hematuria.   Musculoskeletal: Positive for back pain. Negative for arthralgias, joint swelling and neck stiffness.   Skin: Negative for color change, rash and wound.   Neurological: Negative for dizziness, seizures, syncope, numbness and headaches.   Hematological: Negative for adenopathy. Does not bruise/bleed easily.        No obvious bleeding   Psychiatric/Behavioral: Negative for agitation, confusion and hallucinations. The patient is not nervous/anxious.    All other systems reviewed and are negative.      Objective:    Vitals:    10/24/19 0946   BP: 167/69   Pulse: (!) 47   Resp: 18   Temp: 97.6 °F (36.4 °C)   Weight: 84 kg (185 lb 3.2 oz)   Height: 182.9 cm  "(72\")   PainSc:   2   PainLoc: Back  Comment: lower.4wks.       ECOG  (1) Restricted in physically strenuous activity, ambulatory and able to do work of light nature    Physical Exam   Constitutional: He is oriented to person, place, and time. He appears well-developed and well-nourished. No distress.   Wife present during office visit.    HENT:   Head: Normocephalic and atraumatic.   Nose: Nose normal.   Mouth/Throat: Oropharynx is clear and moist. No oropharyngeal exudate.   Dental fillings.    Eyes: Conjunctivae, EOM and lids are normal. Pupils are equal, round, and reactive to light. Right eye exhibits no discharge. Left eye exhibits no discharge. No scleral icterus.   Eye glasses   Neck: Normal range of motion. Neck supple. No thyromegaly present.   Cardiovascular: Normal rate, regular rhythm, normal heart sounds and intact distal pulses. Exam reveals no gallop and no friction rub.   No murmur heard.  Pulmonary/Chest: Effort normal and breath sounds normal. No stridor. No respiratory distress. He has no wheezes.   Abdominal: Soft. Normal appearance and bowel sounds are normal. He exhibits no distension and no mass. There is no tenderness. There is no rebound and no guarding.   Genitourinary:   Genitourinary Comments: Deferred.   Musculoskeletal: Normal range of motion. He exhibits no edema or tenderness.   Degenerative changes to joints   Lymphadenopathy:     He has no cervical adenopathy.     He has no axillary adenopathy.        Right: No supraclavicular adenopathy present.        Left: No supraclavicular adenopathy present.   Neurological: He is alert and oriented to person, place, and time. He exhibits normal muscle tone. Coordination normal.   Skin: Skin is warm and dry. Capillary refill takes less than 2 seconds. No bruising, no petechiae and no rash noted. He is not diaphoretic. No erythema. No pallor.   Psychiatric: He has a normal mood and affect. His speech is normal and behavior is normal. Judgment " and thought content normal. Cognition and memory are normal.   Nursing note and vitals reviewed.    RECENT LABS  WBC   Date Value Ref Range Status   10/24/2019 5.83 3.40 - 10.80 10*3/mm3 Final     RBC   Date Value Ref Range Status   10/24/2019 4.79 4.14 - 5.80 10*6/mm3 Final     Hemoglobin   Date Value Ref Range Status   10/24/2019 14.5 13.0 - 17.7 g/dL Final     Hematocrit   Date Value Ref Range Status   10/24/2019 42.2 37.5 - 51.0 % Final     MCV   Date Value Ref Range Status   10/24/2019 88.1 79.0 - 97.0 fL Final     MCH   Date Value Ref Range Status   10/24/2019 30.3 26.6 - 33.0 pg Final     MCHC   Date Value Ref Range Status   10/24/2019 34.4 31.5 - 35.7 g/dL Final     RDW   Date Value Ref Range Status   10/24/2019 13.1 12.3 - 15.4 % Final     RDW-SD   Date Value Ref Range Status   10/24/2019 41.5 37.0 - 54.0 fl Final     MPV   Date Value Ref Range Status   10/24/2019 9.9 6.0 - 12.0 fL Final     Platelets   Date Value Ref Range Status   10/24/2019 117 (L) 140 - 450 10*3/mm3 Final     Neutrophil %   Date Value Ref Range Status   10/24/2019 71.6 42.7 - 76.0 % Final     Lymphocyte %   Date Value Ref Range Status   10/24/2019 19.2 (L) 19.6 - 45.3 % Final     Monocyte %   Date Value Ref Range Status   10/24/2019 8.1 5.0 - 12.0 % Final     Eosinophil %   Date Value Ref Range Status   10/24/2019 0.9 0.3 - 6.2 % Final     Basophil %   Date Value Ref Range Status   10/24/2019 0.2 0.0 - 1.5 % Final     Neutrophils, Absolute   Date Value Ref Range Status   10/24/2019 4.18 1.70 - 7.00 10*3/mm3 Final     Lymphocytes, Absolute   Date Value Ref Range Status   10/24/2019 1.12 0.70 - 3.10 10*3/mm3 Final     Monocytes, Absolute   Date Value Ref Range Status   10/24/2019 0.47 0.10 - 0.90 10*3/mm3 Final     Eosinophils, Absolute   Date Value Ref Range Status   10/24/2019 0.05 0.00 - 0.40 10*3/mm3 Final     Basophils, Absolute   Date Value Ref Range Status   10/24/2019 0.01 0.00 - 0.20 10*3/mm3 Final     nRBC   Date Value Ref Range  Status   03/15/2019 0 0 /100[WBCs] Final       Lab Results   Component Value Date    GLUCOSE 94 03/15/2019    BUN 11 03/15/2019    CREATININE 0.9 03/15/2019    EGFRIFAFRI NOT CALCULATED 06/21/2018    BCR 12.2 03/15/2019    K 3.9 03/15/2019    CO2 24 03/15/2019    CALCIUM 8.6 (L) 03/15/2019    ALBUMIN 3.9 06/28/2018    LABIL2 1.4 06/28/2018    AST 20 06/28/2018    ALT 18 06/28/2018     ASSESSMENT:    Assessment/Plan     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  He has some increasing fatigue.  He denies fever chills or infections.  He does not have any significant weight change nor has he experienced any night sweats.  His CBC shows mild further decrease in his platelets but they remain adequate at 117,000.  Physical exam does not reveal any significant findings.  I have educated him regarding the platelet count and made him aware treatment is not typically given until the platelets are below 50,000 or if there are bleeding events with a higher level.  He is not exhibiting any overt symptoms of progression of his lymphoma. CTs will be due again in April of next year.     PLAN:  CT chest, abdomen, and pelvis in April 2020. Will order with his next visit and coordinate with CTs planned by urology if possible.   Will check CMP, TSH and Sed Rate today.    I have reviewed labs results, imaging, vitals, and medications with the patient today and have reviewed  information entered by Sandra Ferreira CMA ([facility]).    Will follow up in four months with the nurse practitioner.     Patient verbalized understanding and is in agreement of the above plan.       Electronically signed by TARA Monae, 10/24/19, 11:19 AM.

## 2019-10-24 ENCOUNTER — OFFICE VISIT (OUTPATIENT)
Dept: ONCOLOGY | Facility: CLINIC | Age: 79
End: 2019-10-24

## 2019-10-24 ENCOUNTER — APPOINTMENT (OUTPATIENT)
Dept: LAB | Facility: HOSPITAL | Age: 79
End: 2019-10-24

## 2019-10-24 VITALS
SYSTOLIC BLOOD PRESSURE: 167 MMHG | TEMPERATURE: 97.6 F | WEIGHT: 185.2 LBS | RESPIRATION RATE: 18 BRPM | HEART RATE: 47 BPM | DIASTOLIC BLOOD PRESSURE: 69 MMHG | HEIGHT: 72 IN | BODY MASS INDEX: 25.09 KG/M2

## 2019-10-24 DIAGNOSIS — C82.90 FOLLICULAR LYMPHOMA, UNSPECIFIED FOLLICULAR LYMPHOMA TYPE, UNSPECIFIED BODY REGION (HCC): ICD-10-CM

## 2019-10-24 DIAGNOSIS — D69.3 CHRONIC ITP (IDIOPATHIC THROMBOCYTOPENIA) (HCC): ICD-10-CM

## 2019-10-24 DIAGNOSIS — C83.03 SMALL B-CELL LYMPHOMA OF INTRA-ABDOMINAL LYMPH NODES (HCC): Primary | ICD-10-CM

## 2019-10-24 DIAGNOSIS — R53.83 FATIGUE, UNSPECIFIED TYPE: ICD-10-CM

## 2019-10-24 LAB
ALBUMIN SERPL-MCNC: 4 G/DL (ref 3.5–5.2)
ALBUMIN/GLOB SERPL: 1.7 G/DL
ALP SERPL-CCNC: 63 U/L (ref 39–117)
ALT SERPL W P-5'-P-CCNC: 15 U/L (ref 1–41)
ANION GAP SERPL CALCULATED.3IONS-SCNC: 7 MMOL/L (ref 5–15)
AST SERPL-CCNC: 19 U/L (ref 1–40)
BASOPHILS # BLD AUTO: 0.01 10*3/MM3 (ref 0–0.2)
BASOPHILS NFR BLD AUTO: 0.2 % (ref 0–1.5)
BILIRUB SERPL-MCNC: 0.6 MG/DL (ref 0.2–1.2)
BUN BLD-MCNC: 14 MG/DL (ref 8–23)
BUN/CREAT SERPL: 14.7 (ref 7–25)
CALCIUM SPEC-SCNC: 8.8 MG/DL (ref 8.6–10.5)
CHLORIDE SERPL-SCNC: 106 MMOL/L (ref 98–107)
CO2 SERPL-SCNC: 28 MMOL/L (ref 22–29)
CREAT BLD-MCNC: 0.95 MG/DL (ref 0.76–1.27)
DEPRECATED RDW RBC AUTO: 41.5 FL (ref 37–54)
EOSINOPHIL # BLD AUTO: 0.05 10*3/MM3 (ref 0–0.4)
EOSINOPHIL NFR BLD AUTO: 0.9 % (ref 0.3–6.2)
ERYTHROCYTE [DISTWIDTH] IN BLOOD BY AUTOMATED COUNT: 13.1 % (ref 12.3–15.4)
ERYTHROCYTE [SEDIMENTATION RATE] IN BLOOD: 5 MM/HR (ref 0–20)
GFR SERPL CREATININE-BSD FRML MDRD: 77 ML/MIN/1.73
GLOBULIN UR ELPH-MCNC: 2.3 GM/DL
GLUCOSE BLD-MCNC: 140 MG/DL (ref 65–99)
HCT VFR BLD AUTO: 42.2 % (ref 37.5–51)
HGB BLD-MCNC: 14.5 G/DL (ref 13–17.7)
LYMPHOCYTES # BLD AUTO: 1.12 10*3/MM3 (ref 0.7–3.1)
LYMPHOCYTES NFR BLD AUTO: 19.2 % (ref 19.6–45.3)
MCH RBC QN AUTO: 30.3 PG (ref 26.6–33)
MCHC RBC AUTO-ENTMCNC: 34.4 G/DL (ref 31.5–35.7)
MCV RBC AUTO: 88.1 FL (ref 79–97)
MONOCYTES # BLD AUTO: 0.47 10*3/MM3 (ref 0.1–0.9)
MONOCYTES NFR BLD AUTO: 8.1 % (ref 5–12)
NEUTROPHILS # BLD AUTO: 4.18 10*3/MM3 (ref 1.7–7)
NEUTROPHILS NFR BLD AUTO: 71.6 % (ref 42.7–76)
PLATELET # BLD AUTO: 117 10*3/MM3 (ref 140–450)
PMV BLD AUTO: 9.9 FL (ref 6–12)
POTASSIUM BLD-SCNC: 4 MMOL/L (ref 3.5–5.2)
PROT SERPL-MCNC: 6.3 G/DL (ref 6–8.5)
RBC # BLD AUTO: 4.79 10*6/MM3 (ref 4.14–5.8)
SODIUM BLD-SCNC: 141 MMOL/L (ref 136–145)
TSH SERPL DL<=0.05 MIU/L-ACNC: 1.31 UIU/ML (ref 0.27–4.2)
WBC NRBC COR # BLD: 5.83 10*3/MM3 (ref 3.4–10.8)

## 2019-10-24 PROCEDURE — 36415 COLL VENOUS BLD VENIPUNCTURE: CPT | Performed by: NURSE PRACTITIONER

## 2019-10-24 PROCEDURE — 84443 ASSAY THYROID STIM HORMONE: CPT | Performed by: INTERNAL MEDICINE

## 2019-10-24 PROCEDURE — 99213 OFFICE O/P EST LOW 20 MIN: CPT | Performed by: NURSE PRACTITIONER

## 2019-10-24 PROCEDURE — 85025 COMPLETE CBC W/AUTO DIFF WBC: CPT | Performed by: NURSE PRACTITIONER

## 2019-10-24 PROCEDURE — 85652 RBC SED RATE AUTOMATED: CPT | Performed by: INTERNAL MEDICINE

## 2019-10-24 PROCEDURE — 80053 COMPREHEN METABOLIC PANEL: CPT | Performed by: INTERNAL MEDICINE

## 2020-01-21 ENCOUNTER — TELEPHONE (OUTPATIENT)
Dept: ONCOLOGY | Facility: CLINIC | Age: 80
End: 2020-01-21

## 2020-02-25 ENCOUNTER — LAB REQUISITION (OUTPATIENT)
Dept: LAB | Facility: HOSPITAL | Age: 80
End: 2020-02-25

## 2020-02-25 DIAGNOSIS — Z00.00 ENCOUNTER FOR GENERAL ADULT MEDICAL EXAMINATION WITHOUT ABNORMAL FINDINGS: ICD-10-CM

## 2020-02-25 LAB
BASOPHILS # BLD AUTO: 0 10*3/MM3 (ref 0–0.2)
BASOPHILS NFR BLD AUTO: 0.6 % (ref 0–1.5)
DEPRECATED RDW RBC AUTO: 42 FL (ref 37–54)
EOSINOPHIL # BLD AUTO: 0 10*3/MM3 (ref 0–0.4)
EOSINOPHIL NFR BLD AUTO: 0.9 % (ref 0.3–6.2)
ERYTHROCYTE [DISTWIDTH] IN BLOOD BY AUTOMATED COUNT: 13.6 % (ref 12.3–15.4)
HCT VFR BLD AUTO: 44.9 % (ref 37.5–51)
HGB BLD-MCNC: 15.3 G/DL (ref 13–17.7)
LYMPHOCYTES # BLD AUTO: 1 10*3/MM3 (ref 0.7–3.1)
LYMPHOCYTES NFR BLD AUTO: 18.3 % (ref 19.6–45.3)
MCH RBC QN AUTO: 30.1 PG (ref 26.6–33)
MCHC RBC AUTO-ENTMCNC: 34 G/DL (ref 31.5–35.7)
MCV RBC AUTO: 88.4 FL (ref 79–97)
MONOCYTES # BLD AUTO: 0.3 10*3/MM3 (ref 0.1–0.9)
MONOCYTES NFR BLD AUTO: 5 % (ref 5–12)
NEUTROPHILS # BLD AUTO: 4 10*3/MM3 (ref 1.7–7)
NEUTROPHILS NFR BLD AUTO: 75.2 % (ref 42.7–76)
NRBC BLD AUTO-RTO: 0.2 /100 WBC (ref 0–0.2)
PLATELET # BLD AUTO: 140 10*3/MM3 (ref 140–450)
PMV BLD AUTO: 8.8 FL (ref 6–12)
RBC # BLD AUTO: 5.07 10*6/MM3 (ref 4.14–5.8)
WBC NRBC COR # BLD: 5.4 10*3/MM3 (ref 3.4–10.8)

## 2020-02-25 PROCEDURE — 85025 COMPLETE CBC W/AUTO DIFF WBC: CPT | Performed by: INTERNAL MEDICINE

## 2021-08-20 ENCOUNTER — OFFICE (AMBULATORY)
Dept: URBAN - METROPOLITAN AREA PATHOLOGY 4 | Facility: PATHOLOGY | Age: 81
End: 2021-08-20

## 2021-08-20 ENCOUNTER — LAB REQUISITION (OUTPATIENT)
Dept: LAB | Facility: HOSPITAL | Age: 81
End: 2021-08-20

## 2021-08-20 ENCOUNTER — ON CAMPUS - OUTPATIENT (AMBULATORY)
Dept: URBAN - METROPOLITAN AREA HOSPITAL 2 | Facility: HOSPITAL | Age: 81
End: 2021-08-20

## 2021-08-20 ENCOUNTER — OFFICE (AMBULATORY)
Dept: URBAN - METROPOLITAN AREA PATHOLOGY 4 | Facility: PATHOLOGY | Age: 81
End: 2021-08-20
Payer: COMMERCIAL

## 2021-08-20 VITALS
DIASTOLIC BLOOD PRESSURE: 69 MMHG | HEART RATE: 79 BPM | SYSTOLIC BLOOD PRESSURE: 112 MMHG | SYSTOLIC BLOOD PRESSURE: 146 MMHG | OXYGEN SATURATION: 99 % | DIASTOLIC BLOOD PRESSURE: 54 MMHG | TEMPERATURE: 97.4 F | HEART RATE: 83 BPM | DIASTOLIC BLOOD PRESSURE: 77 MMHG | OXYGEN SATURATION: 93 % | SYSTOLIC BLOOD PRESSURE: 138 MMHG | SYSTOLIC BLOOD PRESSURE: 149 MMHG | HEART RATE: 65 BPM | SYSTOLIC BLOOD PRESSURE: 101 MMHG | DIASTOLIC BLOOD PRESSURE: 61 MMHG | HEART RATE: 60 BPM | HEART RATE: 75 BPM | SYSTOLIC BLOOD PRESSURE: 107 MMHG | HEART RATE: 58 BPM | DIASTOLIC BLOOD PRESSURE: 76 MMHG | RESPIRATION RATE: 20 BRPM | HEART RATE: 63 BPM | OXYGEN SATURATION: 92 % | DIASTOLIC BLOOD PRESSURE: 62 MMHG | SYSTOLIC BLOOD PRESSURE: 154 MMHG | HEART RATE: 64 BPM | DIASTOLIC BLOOD PRESSURE: 73 MMHG | DIASTOLIC BLOOD PRESSURE: 58 MMHG | SYSTOLIC BLOOD PRESSURE: 168 MMHG | HEART RATE: 59 BPM | HEIGHT: 72 IN | DIASTOLIC BLOOD PRESSURE: 81 MMHG | HEART RATE: 70 BPM | SYSTOLIC BLOOD PRESSURE: 152 MMHG | DIASTOLIC BLOOD PRESSURE: 84 MMHG | DIASTOLIC BLOOD PRESSURE: 82 MMHG | DIASTOLIC BLOOD PRESSURE: 79 MMHG | SYSTOLIC BLOOD PRESSURE: 118 MMHG | SYSTOLIC BLOOD PRESSURE: 103 MMHG | OXYGEN SATURATION: 95 % | OXYGEN SATURATION: 98 % | SYSTOLIC BLOOD PRESSURE: 171 MMHG | HEART RATE: 77 BPM | SYSTOLIC BLOOD PRESSURE: 162 MMHG | DIASTOLIC BLOOD PRESSURE: 87 MMHG | RESPIRATION RATE: 16 BRPM | SYSTOLIC BLOOD PRESSURE: 151 MMHG | OXYGEN SATURATION: 97 % | SYSTOLIC BLOOD PRESSURE: 158 MMHG | OXYGEN SATURATION: 96 % | DIASTOLIC BLOOD PRESSURE: 97 MMHG | OXYGEN SATURATION: 94 % | DIASTOLIC BLOOD PRESSURE: 80 MMHG | HEART RATE: 76 BPM | RESPIRATION RATE: 18 BRPM | WEIGHT: 177 LBS | DIASTOLIC BLOOD PRESSURE: 85 MMHG | SYSTOLIC BLOOD PRESSURE: 131 MMHG | HEART RATE: 71 BPM | HEART RATE: 85 BPM | TEMPERATURE: 96.9 F | HEART RATE: 56 BPM

## 2021-08-20 DIAGNOSIS — D12.0 BENIGN NEOPLASM OF CECUM: ICD-10-CM

## 2021-08-20 DIAGNOSIS — Z86.010 PERSONAL HISTORY OF COLONIC POLYPS: ICD-10-CM

## 2021-08-20 DIAGNOSIS — D12.3 BENIGN NEOPLASM OF TRANSVERSE COLON: ICD-10-CM

## 2021-08-20 DIAGNOSIS — J18.9 PNEUMONIA, UNSPECIFIED ORGANISM: ICD-10-CM

## 2021-08-20 PROBLEM — K63.5 POLYP OF COLON: Status: ACTIVE | Noted: 2021-08-20

## 2021-08-20 LAB
ALBUMIN SERPL-MCNC: 3.9 G/DL (ref 3.5–5.2)
ALBUMIN/GLOB SERPL: 1.5 G/DL
ALP SERPL-CCNC: 75 U/L (ref 39–117)
ALT SERPL W P-5'-P-CCNC: 21 U/L (ref 1–41)
ANION GAP SERPL CALCULATED.3IONS-SCNC: 10 MMOL/L (ref 5–15)
AST SERPL-CCNC: 26 U/L (ref 1–40)
BASOPHILS # BLD AUTO: 0 10*3/MM3 (ref 0–0.2)
BASOPHILS NFR BLD AUTO: 0.4 % (ref 0–1.5)
BILIRUB SERPL-MCNC: 0.9 MG/DL (ref 0–1.2)
BUN SERPL-MCNC: 11 MG/DL (ref 8–23)
BUN/CREAT SERPL: 12.8 (ref 7–25)
CALCIUM SPEC-SCNC: 8.2 MG/DL (ref 8.6–10.5)
CHLORIDE SERPL-SCNC: 104 MMOL/L (ref 98–107)
CO2 SERPL-SCNC: 27 MMOL/L (ref 22–29)
CREAT SERPL-MCNC: 0.86 MG/DL (ref 0.76–1.27)
DEPRECATED RDW RBC AUTO: 42.9 FL (ref 37–54)
EOSINOPHIL # BLD AUTO: 0 10*3/MM3 (ref 0–0.4)
EOSINOPHIL NFR BLD AUTO: 0.1 % (ref 0.3–6.2)
ERYTHROCYTE [DISTWIDTH] IN BLOOD BY AUTOMATED COUNT: 13.7 % (ref 12.3–15.4)
GFR SERPL CREATININE-BSD FRML MDRD: 86 ML/MIN/1.73
GI HISTOLOGY: A. UNSPECIFIED: (no result)
GI HISTOLOGY: B. UNSPECIFIED: (no result)
GI HISTOLOGY: C. UNSPECIFIED: (no result)
GI HISTOLOGY: D. UNSPECIFIED: (no result)
GI HISTOLOGY: E. UNSPECIFIED: (no result)
GI HISTOLOGY: PDF REPORT: (no result)
GLOBULIN UR ELPH-MCNC: 2.6 GM/DL
GLUCOSE SERPL-MCNC: 203 MG/DL (ref 65–99)
HCT VFR BLD AUTO: 44 % (ref 37.5–51)
HGB BLD-MCNC: 14.7 G/DL (ref 13–17.7)
LYMPHOCYTES # BLD AUTO: 0.4 10*3/MM3 (ref 0.7–3.1)
LYMPHOCYTES NFR BLD AUTO: 4.6 % (ref 19.6–45.3)
MCH RBC QN AUTO: 29.9 PG (ref 26.6–33)
MCHC RBC AUTO-ENTMCNC: 33.4 G/DL (ref 31.5–35.7)
MCV RBC AUTO: 89.6 FL (ref 79–97)
MONOCYTES # BLD AUTO: 0.3 10*3/MM3 (ref 0.1–0.9)
MONOCYTES NFR BLD AUTO: 3.5 % (ref 5–12)
NEUTROPHILS NFR BLD AUTO: 7.8 10*3/MM3 (ref 1.7–7)
NEUTROPHILS NFR BLD AUTO: 91.4 % (ref 42.7–76)
NRBC BLD AUTO-RTO: 0 /100 WBC (ref 0–0.2)
PLATELET # BLD AUTO: 101 10*3/MM3 (ref 140–450)
PMV BLD AUTO: 8.2 FL (ref 6–12)
POTASSIUM SERPL-SCNC: 4.2 MMOL/L (ref 3.5–5.2)
PROT SERPL-MCNC: 6.5 G/DL (ref 6–8.5)
RBC # BLD AUTO: 4.92 10*6/MM3 (ref 4.14–5.8)
SODIUM SERPL-SCNC: 141 MMOL/L (ref 136–145)
WBC # BLD AUTO: 8.5 10*3/MM3 (ref 3.4–10.8)

## 2021-08-20 PROCEDURE — 45385 COLONOSCOPY W/LESION REMOVAL: CPT | Mod: PT | Performed by: INTERNAL MEDICINE

## 2021-08-20 PROCEDURE — 85025 COMPLETE CBC W/AUTO DIFF WBC: CPT | Performed by: INTERNAL MEDICINE

## 2021-08-20 PROCEDURE — 88305 TISSUE EXAM BY PATHOLOGIST: CPT | Mod: 26 | Performed by: INTERNAL MEDICINE

## 2021-08-20 PROCEDURE — 80053 COMPREHEN METABOLIC PANEL: CPT | Performed by: INTERNAL MEDICINE

## 2021-08-20 RX ADMIN — LEVOFLOXACIN 750 MG: 25 SOLUTION ORAL at 11:04

## 2021-08-20 RX ADMIN — IPRATROPIUM BROMIDE: 17 AEROSOL, METERED RESPIRATORY (INHALATION) at 10:40

## 2021-08-20 RX ADMIN — ALBUTEROL SULFATE: 0.63 SOLUTION INTRABRONCHIAL at 10:40

## 2021-08-21 ENCOUNTER — LAB REQUISITION (OUTPATIENT)
Dept: LAB | Facility: HOSPITAL | Age: 81
End: 2021-08-21

## 2021-08-21 DIAGNOSIS — K44.9 DIAPHRAGMATIC HERNIA WITHOUT OBSTRUCTION OR GANGRENE: ICD-10-CM

## 2021-08-21 DIAGNOSIS — Z86.010 PERSONAL HISTORY OF COLONIC POLYPS: ICD-10-CM

## 2021-08-21 DIAGNOSIS — C85.90 NON-HODGKIN LYMPHOMA, UNSPECIFIED, UNSPECIFIED SITE (HCC): ICD-10-CM

## 2021-08-21 LAB
ALBUMIN SERPL-MCNC: 4 G/DL (ref 3.5–5.2)
ALBUMIN/GLOB SERPL: 1.4 G/DL
ALP SERPL-CCNC: 80 U/L (ref 39–117)
ALT SERPL W P-5'-P-CCNC: 23 U/L (ref 1–41)
ANION GAP SERPL CALCULATED.3IONS-SCNC: 8 MMOL/L (ref 5–15)
AST SERPL-CCNC: 27 U/L (ref 1–40)
BASOPHILS # BLD AUTO: 0.1 10*3/MM3 (ref 0–0.2)
BASOPHILS NFR BLD AUTO: 0.4 % (ref 0–1.5)
BILIRUB SERPL-MCNC: 1.4 MG/DL (ref 0–1.2)
BUN SERPL-MCNC: 10 MG/DL (ref 8–23)
BUN/CREAT SERPL: 9.6 (ref 7–25)
CALCIUM SPEC-SCNC: 8.6 MG/DL (ref 8.6–10.5)
CHLORIDE SERPL-SCNC: 104 MMOL/L (ref 98–107)
CO2 SERPL-SCNC: 28 MMOL/L (ref 22–29)
CREAT SERPL-MCNC: 1.04 MG/DL (ref 0.76–1.27)
DEPRECATED RDW RBC AUTO: 43.3 FL (ref 37–54)
EOSINOPHIL # BLD AUTO: 0 10*3/MM3 (ref 0–0.4)
EOSINOPHIL NFR BLD AUTO: 0.3 % (ref 0.3–6.2)
ERYTHROCYTE [DISTWIDTH] IN BLOOD BY AUTOMATED COUNT: 13.8 % (ref 12.3–15.4)
GFR SERPL CREATININE-BSD FRML MDRD: 69 ML/MIN/1.73
GLOBULIN UR ELPH-MCNC: 2.8 GM/DL
GLUCOSE SERPL-MCNC: 141 MG/DL (ref 65–99)
HCT VFR BLD AUTO: 46.5 % (ref 37.5–51)
HGB BLD-MCNC: 15.5 G/DL (ref 13–17.7)
LYMPHOCYTES # BLD AUTO: 1.3 10*3/MM3 (ref 0.7–3.1)
LYMPHOCYTES NFR BLD AUTO: 9.3 % (ref 19.6–45.3)
MCH RBC QN AUTO: 29.7 PG (ref 26.6–33)
MCHC RBC AUTO-ENTMCNC: 33.4 G/DL (ref 31.5–35.7)
MCV RBC AUTO: 88.9 FL (ref 79–97)
MONOCYTES # BLD AUTO: 0.8 10*3/MM3 (ref 0.1–0.9)
MONOCYTES NFR BLD AUTO: 5.7 % (ref 5–12)
NEUTROPHILS NFR BLD AUTO: 11.8 10*3/MM3 (ref 1.7–7)
NEUTROPHILS NFR BLD AUTO: 84.3 % (ref 42.7–76)
NRBC BLD AUTO-RTO: 0.1 /100 WBC (ref 0–0.2)
PLATELET # BLD AUTO: 125 10*3/MM3 (ref 140–450)
PMV BLD AUTO: 8.5 FL (ref 6–12)
POTASSIUM SERPL-SCNC: 3.9 MMOL/L (ref 3.5–5.2)
PROT SERPL-MCNC: 6.8 G/DL (ref 6–8.5)
RBC # BLD AUTO: 5.22 10*6/MM3 (ref 4.14–5.8)
SODIUM SERPL-SCNC: 140 MMOL/L (ref 136–145)
WBC # BLD AUTO: 14 10*3/MM3 (ref 3.4–10.8)

## 2021-08-21 PROCEDURE — 85025 COMPLETE CBC W/AUTO DIFF WBC: CPT | Performed by: INTERNAL MEDICINE

## 2021-08-21 PROCEDURE — 85025 COMPLETE CBC W/AUTO DIFF WBC: CPT

## 2021-08-21 PROCEDURE — 80053 COMPREHEN METABOLIC PANEL: CPT | Performed by: INTERNAL MEDICINE

## 2021-08-21 PROCEDURE — 80053 COMPREHEN METABOLIC PANEL: CPT

## 2021-10-11 ENCOUNTER — HOSPITAL ENCOUNTER (OUTPATIENT)
Dept: CARDIOLOGY | Facility: HOSPITAL | Age: 81
Discharge: HOME | End: 2021-10-11

## 2021-10-11 ENCOUNTER — LAB (OUTPATIENT)
Dept: LAB | Facility: HOSPITAL | Age: 81
End: 2021-10-11

## 2021-10-11 ENCOUNTER — TRANSCRIBE ORDERS (OUTPATIENT)
Dept: ADMINISTRATIVE | Facility: HOSPITAL | Age: 81
End: 2021-10-11

## 2021-10-11 DIAGNOSIS — Z01.818 PRE-OP TESTING: Primary | ICD-10-CM

## 2021-10-11 DIAGNOSIS — Z01.818 PRE-OP TESTING: ICD-10-CM

## 2021-10-11 LAB
ANION GAP SERPL CALCULATED.3IONS-SCNC: 8.2 MMOL/L (ref 5–15)
BASOPHILS # BLD AUTO: 0.06 10*3/MM3 (ref 0–0.2)
BASOPHILS NFR BLD AUTO: 1.2 % (ref 0–1.5)
BUN SERPL-MCNC: 15 MG/DL (ref 8–23)
BUN/CREAT SERPL: 14.6 (ref 7–25)
CALCIUM SPEC-SCNC: 8.7 MG/DL (ref 8.6–10.5)
CHLORIDE SERPL-SCNC: 105 MMOL/L (ref 98–107)
CO2 SERPL-SCNC: 26.8 MMOL/L (ref 22–29)
CREAT SERPL-MCNC: 1.03 MG/DL (ref 0.76–1.27)
DEPRECATED RDW RBC AUTO: 43.8 FL (ref 37–54)
EOSINOPHIL # BLD AUTO: 0.12 10*3/MM3 (ref 0–0.4)
EOSINOPHIL NFR BLD AUTO: 2.3 % (ref 0.3–6.2)
ERYTHROCYTE [DISTWIDTH] IN BLOOD BY AUTOMATED COUNT: 13.1 % (ref 12.3–15.4)
GFR SERPL CREATININE-BSD FRML MDRD: 70 ML/MIN/1.73
GLUCOSE SERPL-MCNC: 148 MG/DL (ref 65–99)
HCT VFR BLD AUTO: 44.4 % (ref 37.5–51)
HGB BLD-MCNC: 14.6 G/DL (ref 13–17.7)
IMM GRANULOCYTES # BLD AUTO: 0.01 10*3/MM3 (ref 0–0.05)
IMM GRANULOCYTES NFR BLD AUTO: 0.2 % (ref 0–0.5)
LYMPHOCYTES # BLD AUTO: 1 10*3/MM3 (ref 0.7–3.1)
LYMPHOCYTES NFR BLD AUTO: 19.6 % (ref 19.6–45.3)
MCH RBC QN AUTO: 29.7 PG (ref 26.6–33)
MCHC RBC AUTO-ENTMCNC: 32.9 G/DL (ref 31.5–35.7)
MCV RBC AUTO: 90.4 FL (ref 79–97)
MONOCYTES # BLD AUTO: 0.47 10*3/MM3 (ref 0.1–0.9)
MONOCYTES NFR BLD AUTO: 9.2 % (ref 5–12)
NEUTROPHILS NFR BLD AUTO: 3.45 10*3/MM3 (ref 1.7–7)
NEUTROPHILS NFR BLD AUTO: 67.5 % (ref 42.7–76)
NRBC BLD AUTO-RTO: 0 /100 WBC (ref 0–0.2)
PLATELET # BLD AUTO: 127 10*3/MM3 (ref 140–450)
PMV BLD AUTO: 11.2 FL (ref 6–12)
POTASSIUM SERPL-SCNC: 4 MMOL/L (ref 3.5–5.2)
QT INTERVAL: 413 MS
RBC # BLD AUTO: 4.91 10*6/MM3 (ref 4.14–5.8)
SODIUM SERPL-SCNC: 140 MMOL/L (ref 136–145)
WBC # BLD AUTO: 5.11 10*3/MM3 (ref 3.4–10.8)

## 2021-10-11 PROCEDURE — 36415 COLL VENOUS BLD VENIPUNCTURE: CPT

## 2021-10-11 PROCEDURE — 93010 ELECTROCARDIOGRAM REPORT: CPT | Performed by: INTERNAL MEDICINE

## 2021-10-11 PROCEDURE — 80048 BASIC METABOLIC PNL TOTAL CA: CPT

## 2021-10-11 PROCEDURE — 93005 ELECTROCARDIOGRAM TRACING: CPT | Performed by: ORTHOPAEDIC SURGERY

## 2021-10-11 PROCEDURE — 85025 COMPLETE CBC W/AUTO DIFF WBC: CPT

## 2022-12-04 ENCOUNTER — HOSPITAL ENCOUNTER (OUTPATIENT)
Facility: HOSPITAL | Age: 82
Discharge: HOME | End: 2022-12-04
Attending: EMERGENCY MEDICINE | Admitting: EMERGENCY MEDICINE

## 2022-12-04 VITALS
WEIGHT: 180 LBS | DIASTOLIC BLOOD PRESSURE: 85 MMHG | RESPIRATION RATE: 16 BRPM | SYSTOLIC BLOOD PRESSURE: 170 MMHG | BODY MASS INDEX: 24.38 KG/M2 | TEMPERATURE: 99.9 F | HEIGHT: 72 IN | OXYGEN SATURATION: 99 % | HEART RATE: 69 BPM

## 2022-12-04 DIAGNOSIS — J10.1 INFLUENZA A: Primary | ICD-10-CM

## 2022-12-04 LAB
FLUAV SUBTYP SPEC NAA+PROBE: DETECTED
FLUBV RNA ISLT QL NAA+PROBE: NOT DETECTED
SARS-COV-2 RNA RESP QL NAA+PROBE: NOT DETECTED

## 2022-12-04 PROCEDURE — 99202 OFFICE O/P NEW SF 15 MIN: CPT | Performed by: NURSE PRACTITIONER

## 2022-12-04 PROCEDURE — G0463 HOSPITAL OUTPT CLINIC VISIT: HCPCS | Performed by: NURSE PRACTITIONER

## 2022-12-04 PROCEDURE — 87636 SARSCOV2 & INF A&B AMP PRB: CPT

## 2022-12-04 PROCEDURE — 87636 SARSCOV2 & INF A&B AMP PRB: CPT | Performed by: NURSE PRACTITIONER

## 2022-12-04 RX ORDER — LOSARTAN POTASSIUM 50 MG/1
50 TABLET ORAL DAILY
COMMUNITY

## 2022-12-04 RX ORDER — ATORVASTATIN CALCIUM 10 MG/1
10 TABLET, FILM COATED ORAL DAILY
COMMUNITY

## 2022-12-04 NOTE — FSED PROVIDER NOTE
Subjective   History of Present Illness  The patient is an 82-year-old male who presents to the ER with runny nose, cough, congestion that started Thursday or Friday.  Patient reports he is eating and drinking without problems.    History provided by:  Patient      Review of Systems   HENT: Positive for congestion.    Respiratory: Positive for cough.        History reviewed. No pertinent past medical history.    Allergies   Allergen Reactions   • Penicillins Rash   • Soma [Carisoprodol] Rash   • Sulfa Antibiotics Rash       Past Surgical History:   Procedure Laterality Date   • APPENDECTOMY      age 30   • CARDIAC CATHETERIZATION      1993, 2001, and 2015.    • CATARACT EXTRACTION, BILATERAL  07/2015   • COLONOSCOPY      normal -- 2009 and 2014   • ENDOSCOPY      Normal EGD in September 2005, and July 2010.    • HERNIA REPAIR  07/2004   • KNEE ARTHROSCOPY Left 12/2018    uncomplicated, performed by Dr. Thomas   • LAPAROSCOPIC CHOLECYSTECTOMY  07/2004   • OTHER SURGICAL HISTORY      He underwent lithotripsy for chronic kidney stones by Dr. Reeves in 2015, 2014,  2012, 2011, 2009, 2006, 2004, and 2001.    • PROSTATE BIOPSY      benign biopsy in 2006, and 2011.    • RETINAL DETACHMENT SURGERY  05/2015       Family History   Family history unknown: Yes       Social History     Socioeconomic History   • Marital status:    Tobacco Use   • Smoking status: Never   • Smokeless tobacco: Never   Substance and Sexual Activity   • Alcohol use: No   • Drug use: No   • Sexual activity: Defer           Objective   Physical Exam  Vitals and nursing note reviewed.   HENT:      Head: Normocephalic.      Right Ear: Tympanic membrane normal.      Left Ear: Tympanic membrane normal.      Mouth/Throat:      Lips: Pink.      Mouth: Mucous membranes are moist.   Eyes:      Pupils: Pupils are equal, round, and reactive to light.   Cardiovascular:      Rate and Rhythm: Normal rate and regular rhythm.   Pulmonary:      Effort: Pulmonary  effort is normal.      Breath sounds: Normal breath sounds.   Musculoskeletal:         General: Normal range of motion.   Skin:     General: Skin is warm and dry.   Neurological:      General: No focal deficit present.      Mental Status: He is alert and oriented to person, place, and time.   Psychiatric:         Mood and Affect: Mood normal.         Behavior: Behavior is cooperative.         Procedures           ED Course                                           MDM  Number of Diagnoses or Management Options  Influenza A: new and requires workup  Diagnosis management comments: Patient sitting in chair in no acute distress on exam.  Patient able to speak in full sentences.       Amount and/or Complexity of Data Reviewed  Clinical lab tests: reviewed    Risk of Complications, Morbidity, and/or Mortality  Presenting problems: minimal  Diagnostic procedures: minimal  Management options: minimal    Patient Progress  Patient progress: stable      Final diagnoses:   Influenza A       ED Disposition  ED Disposition     ED Disposition   Discharge    Condition   Stable    Comment   --             Jose Mcgowan MD  [address] [place] [address] [place] [facility]  [phone]    In 1 week  As needed, If symptoms worsen         Medication List      No changes were made to your prescriptions during this visit.

## 2022-12-04 NOTE — DISCHARGE INSTRUCTIONS
Follow-up with primary care for further evaluation and treatment as needed.    Make sure you are drinking plenty of fluids.    Tylenol/Motrin for pain/fever as needed

## 2023-03-06 ENCOUNTER — HOSPITAL ENCOUNTER (EMERGENCY)
Facility: HOSPITAL | Age: 83
Discharge: HOME | End: 2023-03-06
Attending: EMERGENCY MEDICINE | Admitting: EMERGENCY MEDICINE
Payer: MEDICARE

## 2023-03-06 ENCOUNTER — APPOINTMENT (OUTPATIENT)
Dept: CT IMAGING | Facility: HOSPITAL | Age: 83
End: 2023-03-06
Payer: MEDICARE

## 2023-03-06 VITALS
DIASTOLIC BLOOD PRESSURE: 77 MMHG | TEMPERATURE: 97.7 F | WEIGHT: 180 LBS | SYSTOLIC BLOOD PRESSURE: 146 MMHG | HEART RATE: 63 BPM | HEIGHT: 72 IN | OXYGEN SATURATION: 97 % | BODY MASS INDEX: 24.38 KG/M2 | RESPIRATION RATE: 18 BRPM

## 2023-03-06 DIAGNOSIS — F07.81 POST CONCUSSIVE SYNDROME: Primary | ICD-10-CM

## 2023-03-06 PROCEDURE — 72125 CT NECK SPINE W/O DYE: CPT

## 2023-03-06 PROCEDURE — 70450 CT HEAD/BRAIN W/O DYE: CPT

## 2023-03-06 PROCEDURE — 99283 EMERGENCY DEPT VISIT LOW MDM: CPT | Performed by: EMERGENCY MEDICINE

## 2023-03-06 PROCEDURE — 99282 EMERGENCY DEPT VISIT SF MDM: CPT

## 2023-03-06 NOTE — FSED PROVIDER NOTE
Subjective   History of Present Illness  83-year-old male presents to the emergency room after having a head injury on Friday evening patient reports he was walking out of his garage which was partially open but his wife hit the button and the door started to close and he hit his head he reports he passed out has retrograde amnesia of the event patient reports he was doing fine however today went to the oncology office and after the office appointment he started stumbling he was instructed to go get a CT scan of his head patient denies any headache denies any motor or sensation change at this time patient was able to ambulate briskly from the waiting room to the stretcher denies any abdominal pain or chest pain denies any shortness of breath denies any vision changes now in ED for further eval        Review of Systems   Constitutional: Negative.    HENT: Negative.    Eyes: Negative.    Respiratory: Negative.    Cardiovascular: Negative.    Gastrointestinal: Negative.    Endocrine: Negative.    Genitourinary: Negative.    Skin: Negative.    Allergic/Immunologic: Negative.    Neurological: Negative.    Hematological: Negative.    Psychiatric/Behavioral: Negative.        No past medical history on file.    Allergies   Allergen Reactions   • Penicillins Rash   • Soma [Carisoprodol] Rash   • Sulfa Antibiotics Rash       Past Surgical History:   Procedure Laterality Date   • APPENDECTOMY      age 30   • CARDIAC CATHETERIZATION      1993, 2001, and 2015.    • CATARACT EXTRACTION, BILATERAL  07/2015   • COLONOSCOPY      normal -- 2009 and 2014   • ENDOSCOPY      Normal EGD in September 2005, and July 2010.    • HERNIA REPAIR  07/2004   • KNEE ARTHROSCOPY Left 12/2018    uncomplicated, performed by Dr. Thomas   • LAPAROSCOPIC CHOLECYSTECTOMY  07/2004   • OTHER SURGICAL HISTORY      He underwent lithotripsy for chronic kidney stones by Dr. Reeves in 2015, 2014,  2012, 2011, 2009, 2006, 2004, and 2001.    • PROSTATE BIOPSY       benign biopsy in 2006, and 2011.    • RETINAL DETACHMENT SURGERY  05/2015       Family History   Family history unknown: Yes       Social History     Socioeconomic History   • Marital status:    Tobacco Use   • Smoking status: Never   • Smokeless tobacco: Never   Substance and Sexual Activity   • Alcohol use: No   • Drug use: No   • Sexual activity: Defer           Objective   Physical Exam  Vitals and nursing note reviewed.   Constitutional:       Appearance: Normal appearance.   HENT:      Head: Normocephalic.      Right Ear: Tympanic membrane and ear canal normal.      Left Ear: Tympanic membrane and ear canal normal.      Nose: Nose normal.      Mouth/Throat:      Mouth: Mucous membranes are moist.      Pharynx: Oropharynx is clear.   Eyes:      Extraocular Movements: Extraocular movements intact.      Pupils: Pupils are equal, round, and reactive to light.   Cardiovascular:      Rate and Rhythm: Normal rate and regular rhythm.      Pulses: Normal pulses.      Heart sounds: Normal heart sounds.   Pulmonary:      Effort: Pulmonary effort is normal.      Breath sounds: Normal breath sounds.   Abdominal:      General: Abdomen is flat.      Palpations: Abdomen is soft.   Musculoskeletal:         General: Normal range of motion.      Cervical back: Normal range of motion and neck supple.   Skin:     General: Skin is warm and dry.      Capillary Refill: Capillary refill takes less than 2 seconds.   Neurological:      General: No focal deficit present.      Mental Status: He is alert and oriented to person, place, and time.   Psychiatric:         Mood and Affect: Mood normal.         Behavior: Behavior normal.         Procedures           ED Course                                           Medical Decision Making  83-year-old male likely suffering from postconcussive syndrome CT head and neck shows no acute fractures or bleeds, advised patient to follow-up primary care doctor recommend close return precautions  patient is otherwise ANO x3 GCS 15 no focal deficits will discharge at this time    Amount and/or Complexity of Data Reviewed  Radiology: ordered.     Details: CT Head Without Contrast (Final result)  Result time 03/06/23 13:49:19  Final result      CT CERVICAL SPINE WO CONTRAST     Date of Exam: 3/6/2023 1:41 PM EST     Indication: Neck trauma (Age >= 65y).     Comparison: None available.     Technique: Axial CT images were obtained of the cervical spine without contrast administration.  Sagittal and coronal reconstructions were performed.  Automated exposure control and iterative reconstruction methods were used.      Findings:  The relationship of the lateral pillars of C1 with respect to C2 appears preserved. The vertebral body heights appear well maintained. There are multilevel degenerative changes involving the cervical spine most pronounced at C5-6 and C6-7. There is an   osseous body adjacent to the spinous process tip of C7 that may relate to an old injury to this area. This appears corticated.     Lower slices through the upper chest reveal some scarring in the apical portion of both lungs.     IMPRESSION:  Impression:  1.Multilevel degenerative change.  2.Osseous body adjacent to the spinous process tip of C7 that may reflect sequela to old trauma     Electronically Signed: Sujit Mcdonald    3/6/2023 1:58 PM EST    Workstation ID: [id number]             Impression:     Impression:   1.Evidence for some underlying cerebral atrophy     Electronically Signed: Sujit Mcdonald    3/6/2023 1:49 PM EST    Workstation ID: [id number]        Narrative:     CT HEAD WO CONTRAST     Date of Exam: 3/6/2023 1:41 PM EST     Indication: Head trauma, minor (Age >= 65y).     Comparison: None available.     Technique: Axial CT images were obtained of the head without contrast administration.  Sagittal and coronal reconstructions were performed.  Automated exposure control and iterative reconstruction methods were used.     Findings:    There is some underlying cerebral atrophy. There is no underlying hemorrhage. There is no midline shift. There are no abnormal extra-axial fluid collections. There is no definite orbital abnormality. The paranasal sinuses seem relatively clear.                     Final diagnoses:   Post concussive syndrome       ED Disposition  ED Disposition     ED Disposition   Discharge    Condition   Stable    Comment   --             Jose Mcgowan MD  [address] [address]  [phone]               Medication List      No changes were made to your prescriptions during this visit.

## 2023-03-21 ENCOUNTER — HOSPITAL ENCOUNTER (OUTPATIENT)
Facility: HOSPITAL | Age: 83
Discharge: HOME | End: 2023-03-21
Attending: EMERGENCY MEDICINE | Admitting: EMERGENCY MEDICINE
Payer: MEDICARE

## 2023-03-21 VITALS
RESPIRATION RATE: 18 BRPM | HEART RATE: 70 BPM | TEMPERATURE: 97.8 F | BODY MASS INDEX: 24.38 KG/M2 | DIASTOLIC BLOOD PRESSURE: 76 MMHG | SYSTOLIC BLOOD PRESSURE: 163 MMHG | OXYGEN SATURATION: 95 % | HEIGHT: 72 IN | WEIGHT: 180 LBS

## 2023-03-21 DIAGNOSIS — U07.1 COVID-19 VIRUS INFECTION: Primary | ICD-10-CM

## 2023-03-21 DIAGNOSIS — J02.9 VIRAL PHARYNGITIS: ICD-10-CM

## 2023-03-21 LAB
FLUAV SUBTYP SPEC NAA+PROBE: NOT DETECTED
FLUBV RNA ISLT QL NAA+PROBE: NOT DETECTED
SARS-COV-2 RNA RESP QL NAA+PROBE: DETECTED
STREP A PCR: NOT DETECTED

## 2023-03-21 PROCEDURE — 87636 SARSCOV2 & INF A&B AMP PRB: CPT | Performed by: EMERGENCY MEDICINE

## 2023-03-21 PROCEDURE — 25010000002 DEXAMETHASONE PER 1 MG: Performed by: EMERGENCY MEDICINE

## 2023-03-21 PROCEDURE — 99213 OFFICE O/P EST LOW 20 MIN: CPT | Performed by: EMERGENCY MEDICINE

## 2023-03-21 PROCEDURE — G0463 HOSPITAL OUTPT CLINIC VISIT: HCPCS | Performed by: EMERGENCY MEDICINE

## 2023-03-21 PROCEDURE — 87651 STREP A DNA AMP PROBE: CPT | Performed by: EMERGENCY MEDICINE

## 2023-03-21 RX ORDER — BROMPHENIRAMINE MALEATE, PSEUDOEPHEDRINE HYDROCHLORIDE, AND DEXTROMETHORPHAN HYDROBROMIDE 2; 30; 10 MG/5ML; MG/5ML; MG/5ML
5 SYRUP ORAL 3 TIMES DAILY PRN
Qty: 210 ML | Refills: 0 | Status: SHIPPED | OUTPATIENT
Start: 2023-03-21

## 2023-03-21 RX ADMIN — DEXAMETHASONE SODIUM PHOSPHATE 8 MG: 10 INJECTION INTRAMUSCULAR; INTRAVENOUS at 18:14

## 2023-03-21 NOTE — FSED PROVIDER NOTE
Subjective   History of Present Illness  Pt presents with mild/moderate severity sore throat, cough, fever, fatigue and not feeling well for the past few days, he states his wife has similar, no n/v/d, able to drink but doesn't taste well, no cp/soa, no alleviating factors    History provided by:  Patient      Review of Systems   Constitutional: Positive for fever.   HENT: Positive for sore throat.    Eyes: Negative for discharge.   Respiratory: Negative for apnea.    Cardiovascular: Negative for chest pain.   All other systems reviewed and are negative.      History reviewed. No pertinent past medical history.    Allergies   Allergen Reactions   • Penicillins Rash   • Soma [Carisoprodol] Rash   • Sulfa Antibiotics Rash       Past Surgical History:   Procedure Laterality Date   • APPENDECTOMY      age 30   • CARDIAC CATHETERIZATION      1993, 2001, and 2015.    • CATARACT EXTRACTION, BILATERAL  07/2015   • COLONOSCOPY      normal -- 2009 and 2014   • ENDOSCOPY      Normal EGD in September 2005, and July 2010.    • HERNIA REPAIR  07/2004   • KNEE ARTHROSCOPY Left 12/2018    uncomplicated, performed by Dr. Thomas   • LAPAROSCOPIC CHOLECYSTECTOMY  07/2004   • OTHER SURGICAL HISTORY      He underwent lithotripsy for chronic kidney stones by Dr. Reeves in 2015, 2014,  2012, 2011, 2009, 2006, 2004, and 2001.    • PROSTATE BIOPSY      benign biopsy in 2006, and 2011.    • RETINAL DETACHMENT SURGERY  05/2015       Family History   Family history unknown: Yes       Social History     Socioeconomic History   • Marital status:    Tobacco Use   • Smoking status: Never   • Smokeless tobacco: Never   Substance and Sexual Activity   • Alcohol use: No   • Drug use: No   • Sexual activity: Defer           Objective   Physical Exam  Vitals and nursing note reviewed.   HENT:      Head: Normocephalic.      Nose: No rhinorrhea.      Mouth/Throat:      Mouth: Mucous membranes are moist.      Pharynx: Posterior oropharyngeal erythema  present.   Eyes:      Conjunctiva/sclera: Conjunctivae normal.   Cardiovascular:      Rate and Rhythm: Normal rate and regular rhythm.   Pulmonary:      Effort: Pulmonary effort is normal.   Musculoskeletal:      Cervical back: Normal range of motion.   Skin:     General: Skin is warm.      Capillary Refill: Capillary refill takes less than 2 seconds.   Neurological:      General: No focal deficit present.      Mental Status: He is alert.   Psychiatric:         Mood and Affect: Mood normal.         Procedures           ED Course                                           MDM    Final diagnoses:   COVID-19 virus infection   Viral pharyngitis       ED Disposition  ED Disposition     ED Disposition   Discharge    Condition   Stable    Comment   --             Jose Mcgowan MD  [address] [facility]  [address]  [place] [address] [phone]    In 3 days  If symptoms worsen         Medication List      New Prescriptions    brompheniramine-pseudoephedrine-DM 30-2-10 MG/5ML syrup  Take 5 mL by mouth 3 (Three) Times a Day As Needed for Cough or Congestion.           Where to Get Your Medications      These medications were sent to [id number] - [place], [place] - [address] AT [address] [address] [address] - [phone] [facility] [phone] FX  [address] [facility] [place] [address]    Phone: [phone]   · brompheniramine-pseudoephedrine-DM 30-2-10 MG/5ML syrup

## 2024-03-27 ENCOUNTER — APPOINTMENT (OUTPATIENT)
Dept: GENERAL RADIOLOGY | Facility: HOSPITAL | Age: 84
End: 2024-03-27
Payer: MEDICARE

## 2024-03-27 ENCOUNTER — HOSPITAL ENCOUNTER (OUTPATIENT)
Facility: HOSPITAL | Age: 84
Discharge: HOME | End: 2024-03-27
Attending: EMERGENCY MEDICINE | Admitting: EMERGENCY MEDICINE
Payer: MEDICARE

## 2024-03-27 VITALS
HEART RATE: 70 BPM | RESPIRATION RATE: 18 BRPM | WEIGHT: 183.5 LBS | OXYGEN SATURATION: 96 % | DIASTOLIC BLOOD PRESSURE: 71 MMHG | HEIGHT: 72 IN | BODY MASS INDEX: 24.85 KG/M2 | SYSTOLIC BLOOD PRESSURE: 166 MMHG | TEMPERATURE: 98.8 F

## 2024-03-27 DIAGNOSIS — J20.9 ACUTE BRONCHITIS, UNSPECIFIED ORGANISM: Primary | ICD-10-CM

## 2024-03-27 DIAGNOSIS — I10 ELEVATED BLOOD PRESSURE READING WITH DIAGNOSIS OF HYPERTENSION: ICD-10-CM

## 2024-03-27 LAB
FLUAV SUBTYP SPEC NAA+PROBE: NOT DETECTED
FLUBV RNA ISLT QL NAA+PROBE: NOT DETECTED
SARS-COV-2 RNA RESP QL NAA+PROBE: NOT DETECTED

## 2024-03-27 PROCEDURE — 71046 X-RAY EXAM CHEST 2 VIEWS: CPT

## 2024-03-27 PROCEDURE — G0463 HOSPITAL OUTPT CLINIC VISIT: HCPCS | Performed by: NURSE PRACTITIONER

## 2024-03-27 PROCEDURE — 87636 SARSCOV2 & INF A&B AMP PRB: CPT | Performed by: EMERGENCY MEDICINE

## 2024-03-27 RX ORDER — AZITHROMYCIN 250 MG/1
TABLET, FILM COATED ORAL
Qty: 6 TABLET | Refills: 0 | Status: SHIPPED | OUTPATIENT
Start: 2024-03-27

## 2024-03-27 NOTE — FSED PROVIDER NOTE
EMERGENCY DEPARTMENT ENCOUNTER    Room Number:  11/11  Date seen:  3/27/2024  Time seen: 10:52 EDT  PCP: Jose Mcgowan MD  Historian: patient    Discussed/obtained information from independent historians: n/a    HPI:  Chief complaint:URI  A complete HPI/ROS/PMH/PSH/SH/FH are unobtainable due to: n/a  Context:Curly Razo is a 84 y.o. male with history of B-cell lymphoma, chronic ITP who presents to the ED with c/o dry cough for the past 2-3 days ago and became productive today with small amount mucous.  Denies sore throat, ear ache or shortness of breath.  Exposed to bronchitis from daughter.  Has tried vicks rub without relief.     ALLERGIES  Penicillins, Soma [carisoprodol], and Sulfa antibiotics    PAST MEDICAL HISTORY  Active Ambulatory Problems     Diagnosis Date Noted    B-cell lymphoma 06/26/2019    Chronic ITP (idiopathic thrombocytopenia) 06/26/2019    Gastritis 06/26/2019    Renal cyst 06/26/2019    Hematuria 06/26/2019     Resolved Ambulatory Problems     Diagnosis Date Noted    No Resolved Ambulatory Problems     Past Medical History:   Diagnosis Date    Hyperlipidemia     Hypertension        PAST SURGICAL HISTORY  Past Surgical History:   Procedure Laterality Date    APPENDECTOMY      age 30    CARDIAC CATHETERIZATION      1993, 2001, and 2015.     CATARACT EXTRACTION, BILATERAL  07/2015    COLONOSCOPY      normal -- 2009 and 2014    ENDOSCOPY      Normal EGD in September 2005, and July 2010.     HERNIA REPAIR  07/2004    KNEE ARTHROSCOPY Left 12/2018    uncomplicated, performed by Dr. Thomas    LAPAROSCOPIC CHOLECYSTECTOMY  07/2004    OTHER SURGICAL HISTORY      He underwent lithotripsy for chronic kidney stones by Dr. Reeves in 2015, 2014,  2012, 2011, 2009, 2006, 2004, and 2001.     PROSTATE BIOPSY      benign biopsy in 2006, and 2011.     RETINAL DETACHMENT SURGERY  05/2015       FAMILY HISTORY  Family History   Family history unknown: Yes       SOCIAL HISTORY  Social History      Socioeconomic History    Marital status:    Tobacco Use    Smoking status: Never    Smokeless tobacco: Never   Substance and Sexual Activity    Alcohol use: No    Drug use: No    Sexual activity: Defer       REVIEW OF SYSTEMS  Review of Systems    All systems reviewed and negative except for those discussed in HPI.     PHYSICAL EXAM    I have reviewed the triage vital signs and nursing notes.  Vitals:    03/27/24 1032   BP: 166/71   Pulse: 70   Resp: 18   Temp: 98.8 °F (37.1 °C)   SpO2: 96%     Physical Exam    GENERAL: not distressed,  pleasant and cooperative  HENT: nares patent  EYES: no scleral icterus  NECK: no ROM limitations  CV: regular rhythm, regular rate, no murmur  RESPIRATORY: normal effort, rhonchi right lower lobe, scant exp wheezing left.  Speaks in full sentences.   ABDOMEN: soft  : deferred  MUSCULOSKELETAL: no deformity  NEURO: alert, moves all extremities, follows commands  SKIN: warm, dry    LAB RESULTS  Recent Results (from the past 24 hour(s))   COVID-19 and FLU A/B PCR, 1 HR TAT - Swab, Nasopharynx    Collection Time: 03/27/24 10:34 AM    Specimen: Nasopharynx; Swab   Result Value Ref Range    COVID19 Not Detected Not Detected - Ref. Range    Influenza A PCR Not Detected Not Detected    Influenza B PCR Not Detected Not Detected       Ordered the above labs and independently interpreted results.  My findings will be discussed in the ED course or medical decision making section below    RADIOLOGY RESULTS  XR Chest 2 View    Result Date: 3/27/2024  XR CHEST 2 VW Date of Exam: 3/27/2024 11:32 AM EDT Indication: soa, wheezing, rhonchi, bronchitis exposure. Comparison: Chest radiograph dated 8/20/2021 Findings: The cardiomediastinal silhouette is within normal limits. Pulmonary vascularity appears normal. There is mild bronchial wall thickening and interstitial opacity within the right lung base, likely corresponding to the right lower lobe likely related to bronchitis/bronchiolitis.  There is no consolidation or pleural effusion. There is no evidence of pneumothorax. There are degenerative changes of the thoracic spine.     Impression: 1. Bronchial wall thickening and interstitial opacity within the right lung base likely corresponding to the right lower lobe likely representing bronchitis or bronchiolitis. No evidence of consolidated pneumonia. Electronically Signed: Sascha Ugarteor  3/27/2024 11:50 AM EDT  Workstation ID: [id number]      Ordered the above noted radiological studies.  Independently interpreted by me.  My findings will be discussed in the medical decision section below.     PROGRESS, DATA ANALYSIS, CONSULTS AND MEDICAL DECISION MAKING    Please note that this section constitutes my independent interpretation of clinical data including lab results, radiology, EKG's.  This constitutes my independent professional opinion regarding differential diagnosis and management of this patient.  It may include any factors such as history from outside sources, review of external records, social determinants of health, management of medications, response to those treatments, and discussions with other providers.    ED Course as of 03/27/24 1206   Wed Mar 27, 2024   1125 COVID19: Not Detected [EW]   1125 Influenza A PCR: Not Detected [EW]   1125 Influenza B PCR: Not Detected [EW]   1200 MDM/dispo: Patient presents with URI symptoms that have been present for several days and a daughter who has bronchitis.  COVID, influenza testing negative.  He is not tachycardic or hypoxic.  Chest x-ray does show bronchitis which I think if untreated will lead to pneumonia.  I do not suspect any ACS and pt is well appearing.  He asked for shot but will treat with Azithromycin.  RTER precautions advised.  [EW]      ED Course User Index  [EW] Raquel Rust APRN     Orders placed during this visit:  Orders Placed This Encounter   Procedures    COVID-19 and FLU A/B PCR, 1 HR TAT - Swab, Nasopharynx    XR Chest 2  View            Medical Decision Making  Problems Addressed:  Acute bronchitis, unspecified organism: complicated acute illness or injury  Elevated blood pressure reading with diagnosis of hypertension: complicated acute illness or injury    Amount and/or Complexity of Data Reviewed  Labs:  Decision-making details documented in ED Course.  Radiology: ordered.    Risk  Prescription drug management.    See ED course        DIAGNOSIS  Final diagnoses:   Acute bronchitis, unspecified organism   Elevated blood pressure reading with diagnosis of hypertension          Medication List        New Prescriptions      azithromycin 250 MG tablet  Commonly known as: Zithromax Z-Enrico  Take 2 tablets the first day, then 1 tablet daily for 4 days.               Where to Get Your Medications        [id number] [place] [facility] [address] AT [address] [address] [facility] [address] - [phone]  - [phone] FX  [address] [facility] [place] [address]      Phone: [phone]   azithromycin 250 MG tablet         FOLLOW-UP  Jose Mcgowan MD  [address] [facility]  [address]  [place] [address] [phone]    Schedule an appointment as soon as possible for a visit in 2 days  As needed, If symptoms worsen        Latest Documented Vital Signs:  As of 12:06 EDT  BP- 166/71 HR- 70 Temp- 98.8 °F (37.1 °C) (Temporal) O2 sat- 96%    Appropriate PPE utilized throughout this patient encounter to include mask, if indicated, per current protocol. Hand hygiene was performed before donning PPE and after removal when leaving the room.    Please note that portions of this were completed with a voice recognition program.     Note Disclaimer: At [facility], we believe that sharing information builds trust and better relationships. You are receiving this note because you are receiving care at [facility] or recently visited. It is possible you will see health information  before a provider has talked with you about it. This kind of information can be easy to misunderstand. To help you fully understand what it means for your health, we urge you to discuss this note with your provider.

## 2024-07-03 ENCOUNTER — LAB (OUTPATIENT)
Dept: LAB | Facility: HOSPITAL | Age: 84
End: 2024-07-03
Payer: MEDICARE

## 2024-07-03 ENCOUNTER — TRANSCRIBE ORDERS (OUTPATIENT)
Dept: ADMINISTRATIVE | Facility: HOSPITAL | Age: 84
End: 2024-07-03
Payer: MEDICARE

## 2024-07-03 ENCOUNTER — HOSPITAL ENCOUNTER (OUTPATIENT)
Dept: CARDIOLOGY | Facility: HOSPITAL | Age: 84
Discharge: HOME | End: 2024-07-03
Payer: MEDICARE

## 2024-07-03 DIAGNOSIS — Z01.818 PRE-OP EXAM: ICD-10-CM

## 2024-07-03 DIAGNOSIS — Z01.818 PRE-OP EXAM: Primary | ICD-10-CM

## 2024-07-03 DIAGNOSIS — N20.0 KIDNEY STONE: ICD-10-CM

## 2024-07-03 LAB
ABO GROUP BLD: NORMAL
ANION GAP SERPL CALCULATED.3IONS-SCNC: 7.2 MMOL/L (ref 5–15)
ANISOCYTOSIS BLD QL: NORMAL
BASOPHILS # BLD AUTO: 0.04 10*3/MM3 (ref 0–0.2)
BASOPHILS NFR BLD AUTO: 0.7 % (ref 0–1.5)
BLD GP AB SCN SERPL QL: NEGATIVE
BUN SERPL-MCNC: 14 MG/DL (ref 8–23)
BUN/CREAT SERPL: 14.6 (ref 7–25)
CALCIUM SPEC-SCNC: 9 MG/DL (ref 8.6–10.5)
CHLORIDE SERPL-SCNC: 105 MMOL/L (ref 98–107)
CO2 SERPL-SCNC: 28.8 MMOL/L (ref 22–29)
CREAT SERPL-MCNC: 0.96 MG/DL (ref 0.76–1.27)
DEPRECATED RDW RBC AUTO: 42.6 FL (ref 37–54)
EGFRCR SERPLBLD CKD-EPI 2021: 78.9 ML/MIN/1.73
EOSINOPHIL # BLD AUTO: 0.06 10*3/MM3 (ref 0–0.4)
EOSINOPHIL NFR BLD AUTO: 1 % (ref 0.3–6.2)
ERYTHROCYTE [DISTWIDTH] IN BLOOD BY AUTOMATED COUNT: 12.8 % (ref 12.3–15.4)
GLUCOSE SERPL-MCNC: 181 MG/DL (ref 65–99)
HCT VFR BLD AUTO: 42.3 % (ref 37.5–51)
HGB BLD-MCNC: 13.9 G/DL (ref 13–17.7)
IMM GRANULOCYTES # BLD AUTO: 0.04 10*3/MM3 (ref 0–0.05)
IMM GRANULOCYTES NFR BLD AUTO: 0.7 % (ref 0–0.5)
LYMPHOCYTES # BLD AUTO: 1.26 10*3/MM3 (ref 0.7–3.1)
LYMPHOCYTES NFR BLD AUTO: 20.9 % (ref 19.6–45.3)
MCH RBC QN AUTO: 29.8 PG (ref 26.6–33)
MCHC RBC AUTO-ENTMCNC: 32.9 G/DL (ref 31.5–35.7)
MCV RBC AUTO: 90.8 FL (ref 79–97)
MONOCYTES # BLD AUTO: 0.42 10*3/MM3 (ref 0.1–0.9)
MONOCYTES NFR BLD AUTO: 7 % (ref 5–12)
NEUTROPHILS NFR BLD AUTO: 4.2 10*3/MM3 (ref 1.7–7)
NEUTROPHILS NFR BLD AUTO: 69.7 % (ref 42.7–76)
NRBC BLD AUTO-RTO: 0 /100 WBC (ref 0–0.2)
PLATELET # BLD AUTO: 121 10*3/MM3 (ref 140–450)
PMV BLD AUTO: 10.8 FL (ref 6–12)
POTASSIUM SERPL-SCNC: 4.1 MMOL/L (ref 3.5–5.2)
QT INTERVAL: 461 MS
QTC INTERVAL: 422 MS
RBC # BLD AUTO: 4.66 10*6/MM3 (ref 4.14–5.8)
RH BLD: POSITIVE
SMALL PLATELETS BLD QL SMEAR: ADEQUATE
SODIUM SERPL-SCNC: 141 MMOL/L (ref 136–145)
T&S EXPIRATION DATE: NORMAL
WBC MORPH BLD: NORMAL
WBC NRBC COR # BLD AUTO: 6.02 10*3/MM3 (ref 3.4–10.8)

## 2024-07-03 PROCEDURE — 86901 BLOOD TYPING SEROLOGIC RH(D): CPT

## 2024-07-03 PROCEDURE — 85025 COMPLETE CBC W/AUTO DIFF WBC: CPT

## 2024-07-03 PROCEDURE — 86900 BLOOD TYPING SEROLOGIC ABO: CPT

## 2024-07-03 PROCEDURE — 80048 BASIC METABOLIC PNL TOTAL CA: CPT

## 2024-07-03 PROCEDURE — 93005 ELECTROCARDIOGRAM TRACING: CPT | Performed by: UROLOGY

## 2024-07-03 PROCEDURE — 86850 RBC ANTIBODY SCREEN: CPT

## 2024-07-03 PROCEDURE — 36415 COLL VENOUS BLD VENIPUNCTURE: CPT

## 2024-07-03 PROCEDURE — 85007 BL SMEAR W/DIFF WBC COUNT: CPT

## 2024-07-08 ENCOUNTER — APPOINTMENT (OUTPATIENT)
Dept: GENERAL RADIOLOGY | Facility: HOSPITAL | Age: 84
End: 2024-07-08
Payer: MEDICARE

## 2024-07-08 ENCOUNTER — HOSPITAL ENCOUNTER (OUTPATIENT)
Facility: HOSPITAL | Age: 84
Discharge: HOME | End: 2024-07-08
Attending: EMERGENCY MEDICINE | Admitting: EMERGENCY MEDICINE
Payer: MEDICARE

## 2024-07-08 VITALS
DIASTOLIC BLOOD PRESSURE: 76 MMHG | RESPIRATION RATE: 18 BRPM | OXYGEN SATURATION: 98 % | SYSTOLIC BLOOD PRESSURE: 176 MMHG | WEIGHT: 180 LBS | TEMPERATURE: 97.9 F | BODY MASS INDEX: 24.38 KG/M2 | HEIGHT: 72 IN | HEART RATE: 67 BPM

## 2024-07-08 DIAGNOSIS — S49.91XA SOFT TISSUE INJURY OF RIGHT SHOULDER, INITIAL ENCOUNTER: ICD-10-CM

## 2024-07-08 DIAGNOSIS — S40.011A CONTUSION OF RIGHT SHOULDER, INITIAL ENCOUNTER: Primary | ICD-10-CM

## 2024-07-08 PROCEDURE — G0463 HOSPITAL OUTPT CLINIC VISIT: HCPCS | Performed by: PHYSICIAN ASSISTANT

## 2024-07-08 PROCEDURE — 73030 X-RAY EXAM OF SHOULDER: CPT

## 2024-07-08 RX ORDER — ACETAMINOPHEN 500 MG
1000 TABLET ORAL ONCE
Status: DISCONTINUED | OUTPATIENT
Start: 2024-07-08 | End: 2024-07-08 | Stop reason: HOSPADM

## 2024-07-08 RX ORDER — CLONIDINE HYDROCHLORIDE 0.1 MG/1
0.1 TABLET ORAL ONCE
Status: COMPLETED | OUTPATIENT
Start: 2024-07-08 | End: 2024-07-08

## 2024-07-08 RX ADMIN — CLONIDINE HYDROCHLORIDE 0.1 MG: 0.1 TABLET ORAL at 21:01

## 2024-07-09 NOTE — [facility] PROVIDER NOTE
EMERGENCY DEPARTMENT ENCOUNTER    Room Number:  OJ/OJ  Date seen:  7/8/2024  Time seen: 20:54 EDT  PCP: Jose Mcgowan MD  Historian: Patient    Discussed/obtained information from independent historians: N/A    HPI:  Chief complaint: Right shoulder injury    A complete HPI/ROS/PMH/PSH/SH/FH are unobtainable due to: Nothing    Context:Curly Razo is a 84 y.o. male who presents to the ED with c/o right shoulder injury that occurred J PTA.  Patient reports he was ambulating lost balance falling onto his right shoulder.  There was no lightheadedness, dizziness, palpitations, chest discomfort leading up to her surrounding his fall.  He states he did not hit his head the process or sustaining any other injury that at the right shoulder.  Pain is said to be moderate and exacerbated with movement.  There is no nausea or vomiting.  Patient denies any rib injury, abdominal pain, lower extremity injury, left upper extremity injury.  Patient's not on anticoagulant or platelet therapy.  He is here for further evaluation.        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     CATARACT EXTRACTION, BILATERAL  07/2015    COLONOSCOPY      normal -- 2009 and 2014    ENDOSCOPY      Normal EGD in September 2005, and July 2010.     HERNIA REPAIR  07/2004    KNEE ARTHROSCOPY Left 12/2018     uncomplicated, performed by Dr. Thomas    LAPAROSCOPIC CHOLECYSTECTOMY  07/2004    OTHER SURGICAL HISTORY      He underwent lithotripsy for chronic kidney stones by Dr. Reeves in 2015, 2014,  2012, 2011, 2009, 2006, 2004, and 2001.     PROSTATE BIOPSY      benign biopsy in 2006, and 2011.     RETINAL DETACHMENT SURGERY  05/2015       FAMILY HISTORY  Family History   Family history unknown: Yes       SOCIAL HISTORY  Social History     Socioeconomic History    Marital status:    Tobacco Use    Smoking status: Never    Smokeless tobacco: Never   Substance and Sexual Activity    Alcohol use: No    Drug use: No    Sexual activity: Defer       REVIEW OF SYSTEMS  Review of Systems    All systems reviewed and negative except for those discussed in HPI.     PHYSICAL EXAM    I have reviewed the triage vital signs and nursing notes.  Vitals:    07/08/24 2132   BP: 176/76   Pulse: 67   Resp: 18   Temp:    SpO2: 98%     Physical Exam    GENERAL: WDWN male, not distressed  HENT: nares patent  EYES: no scleral icterus  NECK: no ROM limitations  CV: regular rhythm, regular rate  RESPIRATORY: normal effort, lungs CTA B  ABDOMEN: soft, nontender, no bruising  : deferred  MUSCULOSKELETAL: Right shoulder: TTP over the lateral aspect.  No obvious deformity.  Clavicle and AC joint are nontender.  Scapula is nontender.  No C-spine, T-spine, L-spine tenderness.  Left upper extremity and bilateral lower extremities unremarkable.  Hip and pelvis unremarkable.   strength in bilateral upper extremities is 5/5.    NEURO: alert, moves all extremities, follows commands, gross sensation intact bilateral upper extremities.,  No radial/median/ulnar motor deficits.  SKIN: warm, dry    LAB RESULTS  No results found for this or any previous visit (from the past 24 hour(s)).    Ordered the above labs and independently interpreted results.  My findings will be discussed in the ED course or medical decision making section below    RADIOLOGY  RESULTS  XR Shoulder 2+ View Right    Result Date: 7/8/2024  XR SHOULDER 2+ VW RIGHT Date of Exam: 7/8/2024 8:31 PM EDT Indication: injury fall. Comparison: None available. FINDINGS: There is no displaced fracture or dislocation. The glenohumeral articulation is intact. Mild  osteoarthritic degenerative changes of the glenohumeral joint are noted. The acromioclavicular joint is intact. Mild  hypertrophic age-related changes of the AC  joint are noted. No other significant focal osseous abnormalities are seen. The soft tissues are unremarkable.     1.No evidence for displaced fracture or dislocation. 2.Mild  osteoarthritic degenerative changes of the glenohumeral joint. 3.Mild  hypertrophic age-related changes of the acromioclavicular joint. Electronically Signed: Melvin Sinha MD  7/8/2024 9:08 PM EDT  Workstation ID: [id number]      Ordered the above noted radiological studies.  Independently interpreted by me.  My findings will be discussed in the medical decision section below.     PROGRESS, DATA ANALYSIS, CONSULTS AND MEDICAL DECISION MAKING    Please note that this section constitutes my independent interpretation of clinical data including lab results, radiology, EKG's.  This constitutes my independent professional opinion regarding differential diagnosis and management of this patient.  It may include any factors such as history from outside sources, review of external records, social determinants of health, management of medications, response to those treatments, and discussions with other providers.    ED Course as of 07/08/24 2258   Mon Jul 08, 2024 2043 I viewed the patient's right shoulder plain films and see no acute fracture/subluxation/dislocation.  Will wait for the radiologist official read. [RC]   2044 BP(!): 216/80  Will recheck manually [RC]   2055 Patient's repeat blood pressure was 201/84.  He normally takes 50 mg of losartan at nighttime and did not have his medication at night.  Will give  0.1 mg of clonidine to try and slightly get it down.  Will have the patient take his medication once he gets home. [RC]   2114 IMPRESSION:  1.No evidence for displaced fracture or dislocation.  2.Mild  osteoarthritic degenerative changes of the glenohumeral joint.  3.Mild  hypertrophic age-related changes of the acromioclavicular joint.        Electronically Signed: Melvin Sinha MD    7/8/2024 9:08 PM EDT    Workstation ID: [id number]   [RC]   2114 No acute fracture.  Plan to treat with sling/rest/Tylenol/close orthopedic follow-up. [RC]   2254 BP: 176/76 [RC]      ED Course User Index  [RC] Micheal Rubio III, PA     Orders placed during this visit:  Orders Placed This Encounter   Procedures    [facility] 03.  Sling & Swathe    XR Shoulder 2+ View Right    Measure Blood Pressure            Medical Decision Making  Problems Addressed:  Contusion of right shoulder, initial encounter: complicated acute illness or injury  Soft tissue injury of right shoulder, initial encounter: complicated acute illness or injury    Amount and/or Complexity of Data Reviewed  Radiology: ordered.    Risk  OTC drugs.  Prescription drug management.      Right shoulder contusion, right shoulder rotator cuff injury, subluxation, dislocation, femoral head head fracture, femoral neck fracture, glenoid fracture, scapular fracture, AC separation.  Plain films of the right shoulder were obtained in triage.  See ED course for remainder of the clinical decision-making process.      DIAGNOSIS  Final diagnoses:   Contusion of right shoulder, initial encounter   Soft tissue injury of right shoulder, initial encounter          Medication List      No changes were made to your prescriptions during this visit.         FOLLOW-UP  Mago Worthy MD  [address] [facility] [address] [phone]    Schedule an appointment as soon as possible for a visit   For further evaluation and treatment    Moises Ram,  MD  [address] [id number]  [phone]    Schedule an appointment as soon as possible for a visit   For further evaluation and treatment        Latest Documented Vital Signs:  As of 22:58 EDT  BP- 176/76 HR- 67 Temp- 97.9 °F (36.6 °C) O2 sat- 98%    Appropriate PPE utilized throughout this patient encounter to include mask, if indicated, per current protocol. Hand hygiene was performed before donning PPE and after removal when leaving the room.    Please note that portions of this were completed with a voice recognition program.     Note Disclaimer: At [facility], we believe that sharing information builds trust and better relationships. You are receiving this note because you are receiving care at [facility] or recently visited. It is possible you will see health information before a provider has talked with you about it. This kind of information can be easy to misunderstand. To help you fully understand what it means for your health, we urge you to discuss this note with your provider.

## 2024-07-09 NOTE — DISCHARGE INSTRUCTIONS
Wear the sling as needed for the next 5 days.  Recommend following up closely with the orthopedist.  Ice and Tylenol for pain.    Return to the ER with any further concerns, should you develop other musculoskeletal pain from the fall that we did not address, or should you have any further concerns.

## 2024-07-12 ENCOUNTER — PATIENT ROUNDING (BHMG ONLY) (OUTPATIENT)
Dept: ORTHOPEDIC SURGERY | Facility: CLINIC | Age: 84
End: 2024-07-12

## 2024-07-12 ENCOUNTER — OFFICE VISIT (OUTPATIENT)
Dept: ORTHOPEDIC SURGERY | Facility: CLINIC | Age: 84
End: 2024-07-12
Payer: MEDICARE

## 2024-07-12 VITALS — HEART RATE: 58 BPM | BODY MASS INDEX: 24.38 KG/M2 | HEIGHT: 72 IN | OXYGEN SATURATION: 97 % | WEIGHT: 180 LBS

## 2024-07-12 DIAGNOSIS — S46.001A ROTATOR CUFF INJURY, RIGHT, INITIAL ENCOUNTER: ICD-10-CM

## 2024-07-12 DIAGNOSIS — M75.51 SUBACROMIAL BURSITIS OF RIGHT SHOULDER JOINT: Primary | ICD-10-CM

## 2024-07-12 RX ORDER — LEVOFLOXACIN 500 MG/1
TABLET, FILM COATED ORAL
COMMUNITY
Start: 2024-06-12

## 2024-07-12 RX ORDER — NITROFURANTOIN 25; 75 MG/1; MG/1
CAPSULE ORAL
COMMUNITY
Start: 2024-06-12

## 2024-07-12 RX ORDER — TRIAMCINOLONE ACETONIDE 40 MG/ML
80 INJECTION, SUSPENSION INTRA-ARTICULAR; INTRAMUSCULAR
Status: COMPLETED | OUTPATIENT
Start: 2024-07-12 | End: 2024-07-12

## 2024-07-12 RX ADMIN — TRIAMCINOLONE ACETONIDE 80 MG: 40 INJECTION, SUSPENSION INTRA-ARTICULAR; INTRAMUSCULAR at 10:56

## 2024-07-12 NOTE — PROGRESS NOTES
A my chart message has been sent to the patient for PATIENT ROUNDING with [facility]

## 2024-07-12 NOTE — PROGRESS NOTES
Primary Care Sports Medicine Office Visit Note    Patient ID: Curly Razo is a 84 y.o. male.    Chief Complaint:  Chief Complaint   Patient presents with   • Right Shoulder - Pain, Initial Evaluation     DOI: 7/8/2024- patient states he tripped over a garbage can        History of Present Illness  The patient presents for evaluation of right shoulder pain.    He recounts a fall on Monday night, tripping over a garbage can, landing directly on his right shoulder. He sought medical attention at [facility], where an x-ray showed no fractures. However, he was referred to an orthopedic surgeon due to the uncertainty about soft tissue damage. Since the fall, he has been wearing a sling. His pain has remained consistent, intensifying during movement or use. He has no history of similar shoulder issues. He is uncertain if he may have pulled a muscle in his upper arm.    Supplemental Information  He is scheduled to have surgery for kidney stones on Monday. Consequently, he can not take any pain medication for 5 days. He has non-Hodgkin's lymphoma.       Past Medical History:   Diagnosis Date   • Hyperlipidemia    • Hypertension    • Tear of meniscus of knee 2021       Past Surgical History:   Procedure Laterality Date   • APPENDECTOMY      age 30   • CARDIAC CATHETERIZATION      1993, 2001, and 2015.    • CATARACT EXTRACTION, BILATERAL  07/2015   • COLONOSCOPY      normal -- 2009 and 2014   • ENDOSCOPY      Normal EGD in September 2005, and July 2010.    • HERNIA REPAIR  07/2004   • KNEE ARTHROSCOPY Left 12/2018    uncomplicated, performed by Dr. Thomas   • LAPAROSCOPIC CHOLECYSTECTOMY  07/2004   • OTHER SURGICAL HISTORY      He underwent lithotripsy for chronic kidney stones by Dr. Reeves in 2015, 2014,  2012, 2011, 2009, 2006, 2004, and 2001.    • PROSTATE BIOPSY      benign biopsy in 2006, and 2011.    • RETINAL DETACHMENT SURGERY  05/2015       Family History   Problem Relation Age of Onset   • Cancer  "Mother    • Osteoporosis Mother      Social History     Occupational History   • Not on file   Tobacco Use   • Smoking status: Never   • Smokeless tobacco: Never   Vaping Use   • Vaping status: Never Used   Substance and Sexual Activity   • Alcohol use: No   • Drug use: No   • Sexual activity: Not Currently     Partners: Female      Review of Systems  Objective:  Review of Systems:  Review of Systems   Constitutional:  Negative for activity change, fatigue and fever.   Musculoskeletal:  Positive for arthralgias.   Skin:  Negative for color change and rash.   Neurological:  Negative for numbness.     Physical Exam  Pulse 58   Ht 182.9 cm (72\")   Wt 81.6 kg (180 lb)   SpO2 97%   BMI 24.41 kg/m²   Vitals and nursing note reviewed.   Constitutional:       General: he is not in acute distress.     Appearance: he is well-developed. He is not diaphoretic.   HENT:      Head: Normocephalic and atraumatic.   Eyes:      Conjunctiva/sclera: Conjunctivae normal.   Pulmonary:      Effort: Pulmonary effort is normal. No respiratory distress.   Skin:     General: Skin is warm.      Capillary Refill: Capillary refill takes less than 2 seconds.   Neurological:      Mental Status: he  is alert.     Right Shoulder Exam     Range of Motion   Active abduction:  120   Passive abduction:  normal   Extension:  normal   External rotation:  normal   Forward flexion:  120   Internal rotation 0 degrees:  normal     Muscle Strength   Abduction: 5/5   External rotation: 4/5   Supraspinatus: 4/5   Subscapularis: 5/5   Biceps: 5/5     Tests   Gillette test: positive  Drop arm: negative    Other   Erythema: absent  Sensation: normal  Pulse: present    Comments:  Mildly positive Micah for pain, positive resisted external rotation for pain as well, negative liftoff.  Negative scarf.  Positive Neer.  Positive speeds/Yergason's.          Physical Exam        Results  Imaging  Three view XR of the right shoulder dated 7/8/2024 reveals no acute bony " "abnormality, mild glenohumeral and acromioclavicular osteoarthropathy, mild decrease in the right rotator cuff interval, but otherwise patent.    Assessment and Plan:    1. Subacromial bursitis of right shoulder joint (Primary)    2. Rotator cuff injury, right, initial encounter    After discussion of risks and benefits, the patient elected to proceed with corticosteroid injection to the right subacromial bursa.  The patient tolerated this procedure well without any complaints or problems.  I recommended continuation of conservative management as previous, but we also discussed adding physical therapy for stretching strengthening of rotator cuff and biceps musculature.  RTC in 3-6 months or sooner if symptoms recur.    Large Joint Arthrocentesis: R subacromial bursa  Date/Time: 7/12/2024 10:56 AM  Consent given by: patient  Site marked: site marked  Timeout: Immediately prior to procedure a time out was called to verify the correct patient, procedure, equipment, support staff and site/side marked as required   Supporting Documentation  Indications: pain   Procedure Details  Location: shoulder - R subacromial bursa  Preparation: Patient was prepped and draped in the usual sterile fashion  Needle size: 25 G  Approach: posterior  Medications administered: 80 mg triamcinolone acetonide 40 MG/ML (6cc of 1% lidocaine without epinepherine and 2cc of 40mg Kenalog)  Patient tolerance: patient tolerated the procedure well with no immediate complications (Blood loss negligable, pt admits to almost immediate pain reflief post injection with gentle ROM.)      Levi JEONG \"Ramon\" Nima CONKLIN DO, CAQSM  07/12/24  10:55 EDT    Disclaimer: Please note that areas of this note were completed with computer voice recognition software.  Quite often unanticipated grammatical, syntax, homophones, and other interpretive errors are inadvertently transcribed by the computer software. Please excuse any errors that have escaped final " proofreading.    Patient or patient representative verbalized consent for the use of Ambient Listening during the visit with  Levi Herring II, DO for chart documentation. 07/12/24  10:55 EDT

## 2024-07-15 PROCEDURE — 82365 CALCULUS SPECTROSCOPY: CPT | Performed by: UROLOGY

## 2024-07-16 ENCOUNTER — LAB REQUISITION (OUTPATIENT)
Dept: LAB | Facility: HOSPITAL | Age: 84
End: 2024-07-16
Payer: MEDICARE

## 2024-07-16 DIAGNOSIS — N20.0 CALCULUS OF KIDNEY: ICD-10-CM

## 2024-07-18 LAB
QT INTERVAL: 461 MS
QTC INTERVAL: 422 MS

## 2024-07-25 LAB
COLOR STONE: NORMAL
COMPN STONE: NORMAL
LABORATORY COMMENT REPORT: NORMAL
LABORATORY COMMENT REPORT: NORMAL
Lab: NORMAL
Lab: NORMAL
PHOTO: NORMAL
SIZE STONE: NORMAL MM
SPEC SOURCE SUBJ: NORMAL
STONE ANALYSIS-IMP: NORMAL
URATE MFR STONE: 100 %
WT STONE: 887 MG